# Patient Record
Sex: FEMALE
[De-identification: names, ages, dates, MRNs, and addresses within clinical notes are randomized per-mention and may not be internally consistent; named-entity substitution may affect disease eponyms.]

---

## 2017-03-31 ENCOUNTER — HOSPITAL ENCOUNTER (EMERGENCY)
Dept: HOSPITAL 42 - ED | Age: 37
Discharge: HOME | End: 2017-03-31
Payer: MEDICAID

## 2017-03-31 VITALS — TEMPERATURE: 98.6 F | HEART RATE: 67 BPM

## 2017-03-31 VITALS — RESPIRATION RATE: 12 BRPM | OXYGEN SATURATION: 99 %

## 2017-03-31 VITALS — BODY MASS INDEX: 23.8 KG/M2

## 2017-03-31 VITALS — DIASTOLIC BLOOD PRESSURE: 84 MMHG | SYSTOLIC BLOOD PRESSURE: 117 MMHG

## 2017-03-31 DIAGNOSIS — F17.210: ICD-10-CM

## 2017-03-31 DIAGNOSIS — R07.89: Primary | ICD-10-CM

## 2017-03-31 LAB
ADD MANUAL DIFF?: NO
ALBUMIN/GLOB SERPL: 1.4 {RATIO} (ref 1.1–1.8)
ALP SERPL-CCNC: 88 U/L (ref 38–133)
ALT SERPL-CCNC: 15 U/L (ref 7–56)
AST SERPL-CCNC: 18 U/L (ref 15–39)
BASOPHILS # BLD AUTO: 0.05 K/MM3 (ref 0–2)
BASOPHILS NFR BLD: 0.4 % (ref 0–3)
BILIRUB SERPL-MCNC: 0.5 MG/DL (ref 0.2–1.3)
BUN SERPL-MCNC: 14 MG/DL (ref 7–21)
CALCIUM SERPL-MCNC: 8.9 MG/DL (ref 8.4–10.5)
CHLORIDE SERPL-SCNC: 101 MMOL/L (ref 95–110)
CO2 SERPL-SCNC: 27 MMOL/L (ref 21–33)
EOSINOPHIL # BLD: 0.2 10*3/UL (ref 0–0.7)
EOSINOPHIL NFR BLD: 1.6 % (ref 1.5–5)
ERYTHROCYTE [DISTWIDTH] IN BLOOD BY AUTOMATED COUNT: 13.5 % (ref 11.5–14.5)
GLOBULIN SER-MCNC: 3.1 GM/DL
GLUCOSE SERPL-MCNC: 88 MG/DL (ref 70–110)
GRANULOCYTES # BLD: 8.29 10*3/UL (ref 1.4–6.5)
GRANULOCYTES NFR BLD: 66.9 % (ref 50–68)
HCT VFR BLD CALC: 41.4 % (ref 36–48)
LIPASE SERPL-CCNC: 125 U/L (ref 23–300)
LYMPHOCYTES # BLD: 3.4 10*3/UL (ref 1.2–3.4)
LYMPHOCYTES NFR BLD AUTO: 27.2 % (ref 22–35)
MCH RBC QN AUTO: 31.7 PG (ref 25–35)
MCHC RBC AUTO-ENTMCNC: 35.5 G/DL (ref 31–37)
MCV RBC AUTO: 89.4 FL (ref 80–105)
MONOCYTES # BLD AUTO: 0.5 10*3/UL (ref 0.1–0.6)
MONOCYTES NFR BLD: 3.9 % (ref 1–6)
PLATELET # BLD: 249 10^3/UL (ref 120–450)
PMV BLD AUTO: 10.6 FL (ref 7–11)
POTASSIUM SERPL-SCNC: 3 MMOL/L (ref 3.6–5)
PROT SERPL-MCNC: 7.3 G/DL (ref 5.8–8.3)
SODIUM SERPL-SCNC: 141 MMOL/L (ref 132–148)
WBC # BLD AUTO: 12.4 10^3/UL (ref 4.5–11)

## 2017-03-31 PROCEDURE — 80053 COMPREHEN METABOLIC PANEL: CPT

## 2017-03-31 PROCEDURE — 96360 HYDRATION IV INFUSION INIT: CPT

## 2017-03-31 PROCEDURE — 99284 EMERGENCY DEPT VISIT MOD MDM: CPT

## 2017-03-31 PROCEDURE — 83690 ASSAY OF LIPASE: CPT

## 2017-03-31 PROCEDURE — 96361 HYDRATE IV INFUSION ADD-ON: CPT

## 2017-03-31 PROCEDURE — 85025 COMPLETE CBC W/AUTO DIFF WBC: CPT

## 2017-03-31 NOTE — ED PDOC
Arrival/HPI





- General


Historian: Patient





- History of Present Illness


Time/Duration: > week


Symptom Course: Unchanged





<Sherif Stanley - Last Filed: 17 10:23>





<Immanuel Blanco DO - Last Filed: 17 10:47>





- General


Chief Complaint: Chest Pain


Time Seen by Provider: 17 08:47





- History of Present Illness


Narrative History of Present Illness (Text): 





17 09:22


35 y/o female with hx osteoporosis presenting with complaints of inferior chest 

wall pain. Pain has been present for over 2 weeks. She presents to the ED today 

because she states the pain worsened overnight. She denies any shortness of 

breath, diaphoresis, n/v/d or abdominal pain. Her chest pain is worse with 

movement and with certain positions. She does not exhibit calf pain, tenderness 

of swelling. She is currently on Depo-Provera shot for birth control and is an 

active smoker. She denies recent travel or prolonged immobility. Patient has no 

personal or family hx of coronary disease.  (Sherif Stanley)








Past Medical History





- Provider Review


Nursing Documentation Reviewed: Yes





- Infectious Disease


Hx of Infectious Diseases: None





- Tetanus Immunization


Tetanus Immunization: Up to Date





- Past Medical History


Past Medical History: No Previous





- Cardiac


Hx Cardiac Disorders: No





- Pulmonary


Hx Respiratory Disorders: No





- Neurological


Hx Neurological Disorder: No





- HEENT


Hx HEENT Disorder: No





- Renal


Hx Renal Disorder: No





- Endocrine/Metabolic


Hx Endocrine Disorders: No





- Hematological/Oncological


Hx Blood Disorders: No





- Integumentary


Hx Dermatological Disorder: No





- Musculoskeletal/Rheumatological


Hx Musculoskeletal Disorders: Yes


Hx Osteoporosis: Yes





- Gastrointestinal


Hx Gastrointestinal Disorders: No





- Genitourinary/Gynecological


Hx Genitourinary Disorders: No





- Psychiatric


Hx Psychophysiologic Disorder: Yes


Hx Anxiety: Yes


Hx Depression: Yes


Hx Physical Abuse: No


Hx Substance Use: No





- Past Surgical History


Past Surgical History: No Previous





- Surgical History


Hx  Section: Yes


Hx Musculoskeletal Surgery: Yes (left foot reconstruction)





- Anesthesia


Hx Anesthesia: Yes


Hx Anesthesia Reactions: No


Hx Malignant Hyperthermia: No





- Suicidal Assessment


Feels Threatened In Home Enviroment: No





<Sherif Stanley - Last Filed: 17 10:23>





Family/Social History





- Physician Review


Nursing Documentation Reviewed: Yes


Family/Social History: No Known Family HX


Smoking Status: Light Smoker < 10 Cigarettes Daily


Hx Alcohol Use: No


Hx Substance Use: No


Hx Substance Use Treatment: No





<LizethSherif - Last Filed: 17 10:23>





Allergies/Home Meds





<Liezth,Sherif - Last Filed: 17 10:23>





<Jeanneriakeyshawn Immanuel CISNEROS - Last Filed: 17 10:47>


Allergies/Adverse Reactions: 


Allergies





No Known Allergies Allergy (Verified 16 13:13)


 








Home Medications: 


 Home Meds











 Medication  Instructions  Recorded  Confirmed


 


clonazePAM [clonAZEPAM] 0.2 mg PO TID 04/01/15 03/31/17


 


Morphine [MS Contin] 60 mg PO TID 16


 


oxyCODONE [oxyCODONE Immediate 30 mg PO Q6 16





Release Tab]   














Review of Systems





- Physician Review


All systems were reviewed & negative as marked: Yes





- Review of Systems


Constitutional: Normal


Eyes: Normal


ENT: Normal


Respiratory: absent: SOB, Cough, Wheezing


Cardiovascular: Chest Pain, Palpitations.  absent: Syncope


Gastrointestinal: absent: Abdominal Pain, Diarrhea, Nausea, Vomiting


Genitourinary Female: absent: Dysuria, Frequency, Hematuria


Musculoskeletal: absent: Arthralgias, Back Pain


Skin: absent: Rash, Pruritis, Skin Lesions


Neurological: absent: Headache, Dizziness, Focal Weakness





<Lizeth,Sherif - Last Filed: 17 10:23>





Physical Exam


Vital Signs Reviewed: Yes


Temperature: Afebrile


Blood Pressure: Normal


Pulse: Regular


Respiratory Rate: Normal


Appearance: Positive for: Well-Appearing


Pain Distress: None


Mental Status: Positive for: Alert and Oriented X 3





- Systems Exam


Head: Present: Atraumatic, Normocephalic


Pupils: Present: PERRL


Extroacular Muscles: Present: EOMI


Conjunctiva: Present: Normal


Mouth: Present: Moist Mucous Membranes


Neck: Present: Normal Range of Motion.  No: JVD


Respiratory/Chest: Present: Good Air Exchange, Rhonchi, Tender to Palpation.  No

: Respiratory Distress


Cardiovascular: Present: Regular Rate and Rhythm, Normal S1, S2


Abdomen: Present: Normal Bowel Sounds.  No: Tenderness, Distention


Back: Present: Normal Inspection.  No: CVA Tenderness


Upper Extremity: Present: Normal Inspection, Normal ROM.  No: Cyanosis, Edema


Neurological: Present: GCS=15, CN II-XII Intact, Speech Normal


Skin: Present: Warm, Dry.  No: Rashes, Normal Color


Psychiatric: Present: Alert, Oriented x 3, Normal Insight, Normal Concentration





<Sherif Stanley - Last Filed: 17 10:23>


Vital Signs











  Temp Pulse Resp BP Pulse Ox


 


 17 09:41   90  12  117/84  99


 


 17 08:43  98.7 F  95 H  18  117/84  100

















Medical Decision Making





<Sherif Stanley - Last Filed: 17 10:23>





- Lab Interpretations


I have reviewed the lab results: Yes





<Immanuel Blanco DO - Last Filed: 17 10:47>


ED Course and Treatment: 


17 09:15


35 y/o female with hx osteoporosis presenting with non-cardiac chest pain. 

Symptoms are musculoskeletal in nature. She has point tenderness on palpation 

of anterior chest wall. Her pain is also exacerbated with pectoral muscle 

contraction. 


- Lipase r/o pancreatitis 


- urine pregnancy 


- will reassess














17 10:23


Chest pain is currently resolved. Labs reviewed. Hypokalemia noted. This is 

likely due to poor dietary intake. Patient will be discharged home to f/u with 

her PCP, Dr. Michel. Will prescribe Flexeril 10mg PRN as needed for chest wall 

discomfort.  (Sherif Stanley)


Patient seen and examined with resident.


Came up with treatment and disposition plan with resident.





The patient is a 36 year old female who comes in for evaluation of inferior 

chest wall pain. Additional HPI details as noted by the resident. Physical 

examination reveal point tenderness to palpation of the anterior chest wall. 

Based on History and physical examination the patient symptoms appear 

musculoskeletal in natures. Will obtain lab work and urine pregnancy to rule 

out pancreatitis and pregnancy. Will give patient IV Fluids. 





On re-evaluation, the patient feels better and is in no acute distress. Results 

and plan was discussed with the patient,  who expressed understanding. Patient 

in agreement with plan to be discharged home. Patient is stable for discharge. 

Patient was instructed to follow up with physician/clinic in 1-2 days or return 

if symptoms worsen or new concerning symptoms arise.





 (Immanuel Blanco DO)








- Lab Interpretations


Lab Results: 








 17 09:45 





 17 09:45 





 Lab Results





17 09:45: WBC 12.4 H, RBC 4.63, Hgb 14.7, Hct 41.4, MCV 89.4, MCH 31.7, 

MCHC 35.5, RDW 13.5, Plt Count 249, MPV 10.6, Gran % 66.9, Lymph % (Auto) 27.2, 

Mono % (Auto) 3.9, Eos % (Auto) 1.6, Baso % (Auto) 0.4, Gran # 8.29 H, Lymph # 

3.4, Mono # 0.5, Eos # 0.2, Baso # 0.05, Sodium 141, Potassium 3.0 L, Chloride 

101, Carbon Dioxide 27, Anion Gap 16, BUN 14, Creatinine 0.8, Est GFR (African 

Amer) > 60, Est GFR (Non-Af Amer) > 60, Random Glucose 88, Calcium 8.9, Total 

Bilirubin 0.5, AST 18, ALT 15, Alkaline Phosphatase 88, Total Protein 7.3, 

Albumin 4.3, Globulin 3.1, Albumin/Globulin Ratio 1.4, Lipase 125














- Medication Orders


Current Medication Orders: 








Sodium Chloride (Sodium Chloride 0.9%)  1,000 mls @ 250 mls/hr IV .Q4H ONE


   Stop: 17 13:07


   Last Admin: 17 09:50  Dose: 250 MLS/HR





eMAR Start Stop


 Document     17 09:50  MMA  (Rec: 17 09:51  MMA  [facility] [id number]


     Intravenous Solution


      Start Date                                 17


      Start Time                                 09:50


      End Date                                   17


      End time                                   13:50


      Total Infusion Time                        240

















<Sherif Stanley - Last Filed: 17 10:23>





- Scribe Statement


The provider has reviewed the documentation as recorded by the Scribe





<Immanuel Blanco DO - Last Filed: 17 10:47>





- Scribe Statement


Suzanna Dorantes





Provider Scribe Attestation:


All medical record entries made by the Scribe were at my direction and 

personally dictated by me. I have reviewed the chart and agree that the record 

accurately reflects my personal performance of the history, physical exam, 

medical decision making, and the department course for this patient. I have 

also personally directed, reviewed, and agree with the discharge instructions 

and disposition.





 (Immanuel Blanco DO)








Disposition/Present on Arrival





- Present on Arrival


Any Indicators Present on Arrival: No


History of DVT/PE: No


History of Uncontrolled Diabetes: No


Urinary Catheter: No


History of Decub. Ulcer: No


History Surgical Site Infection Following: None





- Disposition


Have Diagnosis and Disposition been Completed?: Yes


Disposition Time: 10:24


Isolation: Droplet





<Sherif Stanley - Last Filed: 17 10:23>





<Immanuel Blanco DO - Last Filed: 17 10:47>





- Disposition


Diagnosis: 


 Non-cardiac chest pain


Disposition: HOME/ ROUTINE


Patient Problems: 


 Current Active Problems











Problem Status Diagnosed


 


Non-cardiac chest pain Acute 











Condition: GOOD


Discharge Instructions (ExitCare):  Chest Pain (ED)


Additional Instructions: 


Please see your family physician within one week for evaluation. 


Return to the ER if symptoms worsen or change. 


You are prescribed Flexeril. Take this medication as prescribed for symptoms of 

chest wall pain. 


Prescriptions: 


Cyclobenzaprine [Cyclobenzaprine HCl] 10 mg PO Q8H PRN #15 tab


 PRN Reason: muscle cramping and pain 


Referrals: 


Ameena Michel MD [Primary Care Provider] - Follow up with primary

## 2017-12-11 ENCOUNTER — HOSPITAL ENCOUNTER (INPATIENT)
Dept: HOSPITAL 42 - ED | Age: 37
LOS: 1 days | Discharge: LEFT BEFORE DISCHARGE | DRG: 253 | End: 2017-12-12
Attending: INTERNAL MEDICINE | Admitting: INTERNAL MEDICINE
Payer: MEDICAID

## 2017-12-11 VITALS — RESPIRATION RATE: 18 BRPM | OXYGEN SATURATION: 100 %

## 2017-12-11 VITALS — BODY MASS INDEX: 23.8 KG/M2

## 2017-12-11 DIAGNOSIS — F17.210: ICD-10-CM

## 2017-12-11 DIAGNOSIS — F41.9: ICD-10-CM

## 2017-12-11 DIAGNOSIS — M81.0: ICD-10-CM

## 2017-12-11 DIAGNOSIS — R79.1: ICD-10-CM

## 2017-12-11 DIAGNOSIS — R40.2412: ICD-10-CM

## 2017-12-11 DIAGNOSIS — F32.89: ICD-10-CM
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
## 2017-12-11 DIAGNOSIS — S82.851A: Primary | ICD-10-CM

## 2017-12-11 DIAGNOSIS — E87.6: ICD-10-CM

## 2017-12-11 DIAGNOSIS — D68.9: ICD-10-CM

## 2017-12-11 NOTE — ED PDOC
Arrival/HPI





- General


Chief Complaint: Lower Extremity Problem/Injury


Time Seen by Provider: 17 21:50


Historian: Patient





- History of Present Illness


Narrative History of Present Illness (Text): 


17 23:29


37 year old female who presents to the Emergency department status post 

mechanical fall just prior to arrival. Patient admits to drinking a pint of ENG 

(?alcoholic beverage) and reports she fell down stairs. Patient reports she 

injured her right ankle and foot. Patient denies head trauma, loss of 

consciousness, headache, dizziness, neck pain, back pain, fever, chills, chest 

pain, shortness of breath, nausea, vomiting, or any other complaints.





Time/Duration: Prior to Arrival


Symptom Onset: Sudden


Symptom Course: Unchanged


Context: Walking, Slipped (fell down stairs)





Past Medical History





- Provider Review


Nursing Documentation Reviewed: Yes





- Infectious Disease


Hx of Infectious Diseases: None





- Tetanus Immunization


Tetanus Immunization: Up to Date





- Reproductive


Currently Pregnant: No





- Past Medical History


Past Medical History: No Previous





- Cardiac


Hx Cardiac Disorders: No





- Pulmonary


Hx Respiratory Disorders: No





- Neurological


Hx Neurological Disorder: No





- HEENT


Hx HEENT Disorder: No





- Renal


Hx Renal Disorder: No





- Endocrine/Metabolic


Hx Endocrine Disorders: No





- Hematological/Oncological


Hx Blood Disorders: No





- Integumentary


Hx Dermatological Disorder: No





- Musculoskeletal/Rheumatological


Hx Musculoskeletal Disorders: Yes


Hx Osteoporosis: Yes





- Gastrointestinal


Hx Gastrointestinal Disorders: No





- Genitourinary/Gynecological


Hx Genitourinary Disorders: No





- Psychiatric


Hx Psychophysiologic Disorder: Yes


Hx Anxiety: Yes


Hx Depression: Yes


Hx Physical Abuse: No


Hx Substance Use: No





- Past Surgical History


Past Surgical History: No Previous





- Surgical History


Hx  Section: Yes


Hx Musculoskeletal Surgery: Yes (left foot reconstruction)





- Anesthesia


Hx Anesthesia: Yes


Hx Anesthesia Reactions: No


Hx Malignant Hyperthermia: No





- Suicidal Assessment


Feels Threatened In Home Enviroment: No





Family/Social History





- Physician Review


Nursing Documentation Reviewed: Yes


Family/Social History: Unknown Family HX


Smoking Status: Light Smoker < 10 Cigarettes Daily


Hx Alcohol Use: No


Hx Substance Use: No


Hx Substance Use Treatment: No





Allergies/Home Meds


Allergies/Adverse Reactions: 


Allergies





No Known Allergies Allergy (Verified 16 13:13)


 








Home Medications: 


 Home Meds











 Medication  Instructions  Recorded  Confirmed


 


clonazePAM [clonAZEPAM] 0.2 mg PO TID 04/01/15 12/12/17


 


Morphine [MS Contin] 60 mg PO TID 16


 


oxyCODONE [oxyCODONE Immediate 30 mg PO Q6 16





Release Tab]   














Review of Systems





- Patients Enrolled in  Initiative


[X]: A conversation was conducted with the primary medical doctor.





- Physician Review


All systems were reviewed & negative as marked: Yes





- Review of Systems


Constitutional: Normal.  absent: Fevers


Eyes: Normal


ENT: Normal


Respiratory: Normal.  absent: SOB, Cough


Cardiovascular: Normal.  absent: Chest Pain, Syncope


Gastrointestinal: Normal.  absent: Abdominal Pain, Nausea, Vomiting


Genitourinary Female: Normal.  absent: Dysuria


Musculoskeletal: Arthralgias (+right ankle/foot pain).  absent: Back Pain, Neck 

Pain


Skin: Normal


Neurological: Normal.  absent: Headache, Dizziness


Endocrine: Normal


Hemo/Lymphatic: Normal


Psychiatric: Normal





Physical Exam


Vital Signs Reviewed: Yes


Vital Signs











  Temp Pulse Resp BP Pulse Ox


 


 17 23:22  98.6 F  90  18  138/82  100


 


 17 21:30  97.5 F L  89  18  125/98 H  100











Temperature: Afebrile


Blood Pressure: Normal


Pulse: Regular


Respiratory Rate: Normal


Appearance: Positive for: Well-Appearing, Other (Smell of alcohol)


Pain Distress: None


Mental Status: Positive for: other (pt is sleeping, arousable, responsive to 

painful stimuli. )





- Systems Exam


Head: Present: Atraumatic, Normocephalic


Pupils: Present: PERRL


Extroacular Muscles: Present: EOMI


Conjunctiva: Present: Normal


Mouth: Present: Moist Mucous Membranes


Neck: Present: Normal Range of Motion.  No: Meningeal Signs, MIDLINE TENDERNESS

, Paraspinal Tenderness


Respiratory/Chest: Present: Clear to Auscultation, Good Air Exchange.  No: 

Respiratory Distress, Accessory Muscle Use


Cardiovascular: Present: Regular Rate and Rhythm, Normal S1, S2.  No: Murmurs


Abdomen: Present: Normal Bowel Sounds.  No: Tenderness, Distention, Peritoneal 

Signs


Back: Present: Normal Inspection.  No: CVA Tenderness, Midline Tenderness, 

Paraspinal Tenderness, Pain with Leg Raise


Upper Extremity: Present: Normal Inspection.  No: Cyanosis, Edema


Lower Extremity: Present: Edema (mild edema to right ankle), NORMAL PULSES, 

Tenderness (moderate tenderness to right ankle, right foot is not tender to 

palpation), Deformity (slight deformity of right ankle), Neurovascularly Intact

, Capillary Refill < 2 s.  No: CALF TENDERNESS, Cyanosis, Normal ROM (limited 

ROM to right ankle secondary to pain), Swelling, Other (no bruising to right 

foot)


Neurological: Present: GCS=15, CN II-XII Intact, Speech Normal, Motor Func 

Grossly Intact, Normal Sensory Function


Skin: Present: Warm, Dry, Normal Color.  No: Rashes


Psychiatric: Present: Alert, Oriented x 3, Normal Insight, Normal Concentration





Medical Decision Making


ED Course and Treatment: 


17 23:29


Impression:


37 year old female presents to the ED status post fall and injury to right foot 

and ankle.





Upon initial evaluation, patient patient appears to be under the influence of 

either alcohol or drugs, will give the patient PO tramadol for now for pain 

control. XR R ankle and foot ordered. 





Differential Diagnosis included but are not limited to: fracture vs sprain vs 

contusion





Plan:


-- Labs, Alcohol serum, Beta-HCG, blood type and screen 


-- Urinalysis, urine drug Screen


-- Morphine


-- Zofran


-- Tramadol


-- IV fluids, banana bag


-- Right Ankle x-ray


-- Right Foot x-ray


--Reassess and disposition





Prior Visits:


Notes and results from previous visits were reviewed. On 3/31/17 patient was 

evaluated in the Emergency department for chest wall pain. Patient was 

discharged home.





Progress Notes:


XR R ankle : +trimalleolar fracture, no dislocation, as read by PA and ER MD. 


XR R foot : no fracture, no dislocation, as read by PA and ER MD. 





Call placed to ortho on call Dr. Christianson, agree with inpt admission and 

for OR in the AM, request that the patient be admitted under the hospitalist. 

Labs and IV ordered. Patient and her daughter were notified of XR results and 

further plan of action for inpatient admission. 





Orthoglass stirrup and posterior splint applied by PA. Neurovascular intact 

post splint application. Given morphine 4 mg IV and zofran 4 mg IV. 





Case d/w Dr. Morris, agrees with plan for inpt admission. 





Etoh level is (-). UDS is pending. 








After evaluation by Dr. Morris, the patient became loud, is belligerent and 

uncooperative. She is threatening to leave the hospital, however the patient is 

still  not of sound mind and does not comprehend the extent of her injury and 

the necessary treatment needed. Patient medicated with ativan 1 mg IV. Because 

the patient is now becoming physically violent to staff and attempting the hit 

EMTs and RNS, 3 point restraints were applied. Patient was escorted by RN 

supervisor and security upstairs to the medical floor. 





Patient's fiance arrived, states that the patient takes klonopin daily and at 

times becomes verbally abusive to him and her family at times. 








- Lab Interpretations


Lab Results: 








 17 23:50 





 17 23:50 





 Lab Results





17 23:50: Magnesium 2.1


17 23:50: Beta HCG, Quant < 2.39, Alcohol, Quantitative < 10


17 23:50: Sodium 140, Potassium 3.4 L, Chloride 104, Carbon Dioxide 25, 

Anion Gap 15, BUN 15, Creatinine 0.9, Est GFR (African Amer) > 60, Est GFR (Non-

Af Amer) > 60, Random Glucose 105, Calcium 9.9, Total Bilirubin 1.1, AST 21, 

ALT 20, Alkaline Phosphatase 98, Total Protein 8.3, Albumin 5.0 H, Globulin 3.3

, Albumin/Globulin Ratio 1.5


17 23:50: PT 16.9 H, INR 1.54 H, APTT 32.1


17 23:50: WBC 22.2 H D, RBC 5.39, Hgb 17.7 H, Hct 49.5 H, MCV 91.8, MCH 

32.8, MCHC 35.8, RDW 12.9, Plt Count 234, MPV 11.1 H, Gran % 80.3 H, Lymph % (

Auto) 14.7 L, Mono % (Auto) 4.2, Eos % (Auto) 0.5 L, Baso % (Auto) 0.3, Gran # 

17.81 H, Lymph # 3.3, Mono # 0.9 H, Eos # 0.1, Baso # 0.06








I have reviewed the lab results: Yes





- RAD Interpretation


Radiology Orders: 








17 21:50


ANKLE RIGHT 3 VIEWS ROUTINE [RAD] Stat 


FOOT RIGHT 3 VIEWS ROUTINE [RAD] Stat 











: ED Physician





- Medication Orders


Current Medication Orders: 








Famotidine (Pepcid)  20 mg IVP DAILY RON


Multivitamins/Vitamin C 10 ml/Thiamine HCl 100 mg/ Folic Acid 1 mg/ Sodium 

Chloride  1,011.2 mls @ 500 mls/hr IV .Q2H2M ONE


   Stop: 17 02:04


Sodium Chloride (Sodium Chloride 0.9%)  1,000 mls @ 100 mls/hr IV .Q10H RON


Morphine Sulfate (Morphine)  1 mg IVP Q4H PRN


   PRN Reason: Pain, moderate (4-7)





Discontinued Medications





Sodium Chloride (Sodium Chloride 0.9%)  1,000 mls @ 1,000 mls/hr IV .Q1H STA


   Stop: 17 00:19


   Last Admin: 17 23:52  Dose: 1,000 mls/hr





eMAR Start Stop


 Document     17 23:52  AB  (Rec: 17 23:52  [id number]


     Intravenous Solution


      Start Date                                 17


      Start Time                                 23:52


      End Date                                   17





Lorazepam (Ativan)  1 mg IVP ONCE ONE


   PRN Reason: Protocol


   Stop: 17 00:57


   Last Admin: 17 01:00  Dose: 1 mg





IVP Administration


 Document     17 01:00  AB  (Rec: 17 01:00  [id number]


     Charges for Administration


      # of IVP Administrations                   1





Lorazepam (Ativan)  2 mg IVP ONCE ONE


   PRN Reason: Protocol


   Stop: 17 01:19


Morphine Sulfate (Morphine)  4 mg IVP STAT STA


   Stop: 17 23:47


   Last Admin: 17 23:51  Dose: 4 mg





MAR Pain Assessment


 Document     17 23:51  AB  (Rec: 17 23:52  [id number]


     Pain Reassessment


      Is this a pain reassessment?               Yes


     Sleep


      Is patient sleeping during reassessment?   No


     Presence of Pain


      Presence of Pain                           Yes


     Pain Scale Used


      Pain Scale Used                            Numeric


     Location


      Left, Right or Bilateral                   Right


      Upper or Lower                             Lower


      Pain Location Body Site                    Leg


     Description


      Description                                Constant


IVP Administration


 Document     17 23:51  AB  (Rec: 17 23:52  [id number]


     Charges for Administration


      # of IVP Administrations                   1





Ondansetron HCl (Zofran Inj)  4 mg IVP STAT STA


   Stop: 17 23:47


   Last Admin: 17 23:55  Dose: 4 mg





IVP Administration


 Document     17 23:55  AB  (Rec: 17 23:55  [id number]


     Charges for Administration


      # of IVP Administrations                   1





Potassium Chloride (Potassium Chloride Oral Soln)  40 meq PO ONCE ONE


   Stop: 17 00:43


Tramadol HCl (Ultram)  50 mg PO STAT STA


   Stop: 17 21:51











- PA / NP / Resident Statement


MD/DO has reviewed & agrees with the documentation as recorded.





- Scribe Statement


The provider has reviewed the documentation as recorded by the Scriblisa Hess





All medical record entries made by the Scriblisa were at my direction and 

personally dictated by me. I have reviewed the chart and agree that the record 

accurately reflects my personal performance of the history, physical exam, 

medical decision making, and the department course for this patient. I have 

also personally directed, reviewed, and agree with the discharge instructions 

and disposition.








Disposition/Present on Arrival





- Present on Arrival


Any Indicators Present on Arrival: No


History of DVT/PE: No


History of Uncontrolled Diabetes: No


Urinary Catheter: No


History of Decub. Ulcer: No


History Surgical Site Infection Following: None





- Disposition


Have Diagnosis and Disposition been Completed?: Yes


Diagnosis: 


 Trimalleolar fracture of right ankle





Disposition: HOSPITALIZED


Disposition Time: 00:15


Patient Plan: Admission


Patient Problems: 


 Current Active Problems











Problem Status Onset


 


Trimalleolar fracture of right ankle Acute  











Condition: STABLE

## 2017-12-12 ENCOUNTER — HOSPITAL ENCOUNTER (INPATIENT)
Dept: HOSPITAL 42 - ED | Age: 37
LOS: 2 days | Discharge: HOME | DRG: 253 | End: 2017-12-14
Attending: INTERNAL MEDICINE | Admitting: INTERNAL MEDICINE
Payer: MEDICAID

## 2017-12-12 VITALS — SYSTOLIC BLOOD PRESSURE: 138 MMHG | HEART RATE: 90 BPM | TEMPERATURE: 98.6 F | DIASTOLIC BLOOD PRESSURE: 82 MMHG

## 2017-12-12 VITALS — BODY MASS INDEX: 26.4 KG/M2

## 2017-12-12 DIAGNOSIS — F43.10: ICD-10-CM

## 2017-12-12 DIAGNOSIS — S82.871A: ICD-10-CM
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
## 2017-12-12 DIAGNOSIS — R79.1: ICD-10-CM

## 2017-12-12 DIAGNOSIS — R45.851: ICD-10-CM
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
## 2017-12-12 DIAGNOSIS — E78.00: ICD-10-CM

## 2017-12-12 DIAGNOSIS — F13.90: ICD-10-CM
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
## 2017-12-12 DIAGNOSIS — F17.200: ICD-10-CM

## 2017-12-12 DIAGNOSIS — M81.0: ICD-10-CM

## 2017-12-12 DIAGNOSIS — Z79.891: ICD-10-CM

## 2017-12-12 DIAGNOSIS — I10: ICD-10-CM

## 2017-12-12 DIAGNOSIS — Z87.01: ICD-10-CM

## 2017-12-12 DIAGNOSIS — F19.20: ICD-10-CM

## 2017-12-12 DIAGNOSIS — Z91.19: ICD-10-CM

## 2017-12-12 DIAGNOSIS — Z82.5: ICD-10-CM

## 2017-12-12 DIAGNOSIS — S82.851A: Primary | ICD-10-CM

## 2017-12-12 DIAGNOSIS — F41.8: ICD-10-CM

## 2017-12-12 DIAGNOSIS — Z79.899: ICD-10-CM

## 2017-12-12 DIAGNOSIS — D72.828: ICD-10-CM

## 2017-12-12 DIAGNOSIS — E87.6: ICD-10-CM

## 2017-12-12 DIAGNOSIS — F91.9: ICD-10-CM

## 2017-12-12 LAB
ALBUMIN/GLOB SERPL: 1.5 {RATIO} (ref 1.1–1.8)
ALP SERPL-CCNC: 98 U/L (ref 38–126)
ALT SERPL-CCNC: 20 U/L (ref 7–56)
APPEARANCE UR: (no result)
APTT BLD: 32.1 SECONDS (ref 25.1–36.5)
AST SERPL-CCNC: 21 U/L (ref 14–36)
BACTERIA #/AREA URNS HPF: (no result) /[HPF]
BASOPHILS # BLD AUTO: 0.05 K/MM3 (ref 0–2)
BASOPHILS # BLD AUTO: 0.06 K/MM3 (ref 0–2)
BASOPHILS NFR BLD: 0.3 % (ref 0–3)
BASOPHILS NFR BLD: 0.3 % (ref 0–3)
BILIRUB SERPL-MCNC: 1.1 MG/DL (ref 0.2–1.3)
BILIRUB UR-MCNC: NEGATIVE MG/DL
BUN SERPL-MCNC: 15 MG/DL (ref 7–21)
CALCIUM SERPL-MCNC: 9.9 MG/DL (ref 8.4–10.5)
CHLORIDE SERPL-SCNC: 104 MMOL/L (ref 98–107)
CO2 SERPL-SCNC: 25 MMOL/L (ref 21–33)
COLOR UR: YELLOW
EOSINOPHIL # BLD: 0.1 10*3/UL (ref 0–0.7)
EOSINOPHIL # BLD: 0.1 10*3/UL (ref 0–0.7)
EOSINOPHIL NFR BLD: 0.5 % (ref 1.5–5)
EOSINOPHIL NFR BLD: 0.5 % (ref 1.5–5)
ERYTHROCYTE [DISTWIDTH] IN BLOOD BY AUTOMATED COUNT: 12.8 % (ref 11.5–14.5)
ERYTHROCYTE [DISTWIDTH] IN BLOOD BY AUTOMATED COUNT: 12.9 % (ref 11.5–14.5)
ETHANOL SERPL-MCNC: < 10 MG/DL (ref 0–10)
GLOBULIN SER-MCNC: 3.3 GM/DL
GLUCOSE SERPL-MCNC: 105 MG/DL (ref 70–110)
GLUCOSE UR STRIP-MCNC: NEGATIVE MG/DL
GRANULOCYTES # BLD: 15.16 10*3/UL (ref 1.4–6.5)
GRANULOCYTES # BLD: 17.81 10*3/UL (ref 1.4–6.5)
GRANULOCYTES NFR BLD: 80.1 % (ref 50–68)
GRANULOCYTES NFR BLD: 80.3 % (ref 50–68)
HCT VFR BLD CALC: 47.7 % (ref 36–48)
HCT VFR BLD CALC: 49.5 % (ref 36–48)
INR PPP: 1.54 (ref 0.93–1.08)
KETONES UR STRIP-MCNC: 15 MG/DL
LEUKOCYTE ESTERASE UR-ACNC: NEGATIVE LEU/UL
LYMPHOCYTES # BLD: 2.5 10*3/UL (ref 1.2–3.4)
LYMPHOCYTES # BLD: 3.3 10*3/UL (ref 1.2–3.4)
LYMPHOCYTES NFR BLD AUTO: 13.1 % (ref 22–35)
LYMPHOCYTES NFR BLD AUTO: 14.7 % (ref 22–35)
MCH RBC QN AUTO: 32 PG (ref 25–35)
MCH RBC QN AUTO: 32.8 PG (ref 25–35)
MCHC RBC AUTO-ENTMCNC: 34.4 G/DL (ref 31–37)
MCHC RBC AUTO-ENTMCNC: 35.8 G/DL (ref 31–37)
MCV RBC AUTO: 91.8 FL (ref 80–105)
MCV RBC AUTO: 93 FL (ref 80–105)
MONOCYTES # BLD AUTO: 0.9 10*3/UL (ref 0.1–0.6)
MONOCYTES # BLD AUTO: 1.1 10*3/UL (ref 0.1–0.6)
MONOCYTES NFR BLD: 4.2 % (ref 1–6)
MONOCYTES NFR BLD: 6 % (ref 1–6)
PH UR STRIP: 6.5 [PH] (ref 4.7–8)
PLATELET # BLD: 234 10^3/UL (ref 120–450)
PLATELET # BLD: 238 10^3/UL (ref 120–450)
PMV BLD AUTO: 11.1 FL (ref 7–11)
PMV BLD AUTO: 11.6 FL (ref 7–11)
POTASSIUM SERPL-SCNC: 3.4 MMOL/L (ref 3.6–5)
PROT SERPL-MCNC: 8.3 G/DL (ref 5.8–8.3)
PROT UR STRIP-MCNC: (no result) MG/DL
RBC # UR STRIP: (no result) /UL
SODIUM SERPL-SCNC: 140 MMOL/L (ref 132–148)
SP GR UR STRIP: 1.02 (ref 1–1.03)
UROBILINOGEN UR STRIP-ACNC: 0.2 E.U./DL
WBC # BLD AUTO: 18.9 10^3/UL (ref 4.5–11)
WBC # BLD AUTO: 22.2 10^3/UL (ref 4.5–11)

## 2017-12-12 RX ADMIN — CEFTRIAXONE SCH MLS/HR: 1 INJECTION, SOLUTION INTRAVENOUS at 14:54

## 2017-12-12 RX ADMIN — MORPHINE SULFATE SCH: 2 INJECTION, SOLUTION INTRAMUSCULAR; INTRAVENOUS at 16:43

## 2017-12-12 RX ADMIN — MORPHINE SULFATE SCH MG: 2 INJECTION, SOLUTION INTRAMUSCULAR; INTRAVENOUS at 14:49

## 2017-12-12 RX ADMIN — MORPHINE SULFATE SCH MG: 2 INJECTION, SOLUTION INTRAMUSCULAR; INTRAVENOUS at 19:41

## 2017-12-12 RX ADMIN — VANCOMYCIN HYDROCHLORIDE SCH MLS/HR: 1 INJECTION, POWDER, LYOPHILIZED, FOR SOLUTION INTRAVENOUS at 16:06

## 2017-12-12 NOTE — RAD
PROCEDURE:  Right Foot Radiographs.



HISTORY:

pain  



COMPARISON:

None.



FINDINGS:



BONES:

Normal. No fracture. 



JOINTS:

Normal. 



SOFT TISSUES:

Normal. 



OTHER FINDINGS:

None.



IMPRESSION:

Normal right foot radiographs.

## 2017-12-12 NOTE — CP.PCM.DIS
Provider





- Provider


Date of Admission: 


12/11/17 23:55





Attending physician: 


Jonny Khan MD





Primary care physician: 


Ameena Michel MD





Time Spent in preparation of Discharge (in minutes): 45





Diagnosis





- Discharge Diagnosis


(1) Trimalleolar fracture of right ankle


Status: Acute   





Hospital Course





- Lab Results


Lab Results: 


 Most Recent Lab Values











WBC  22.2 10^3/ul (4.5-11.0)  H D 12/11/17  23:50    


 


RBC  5.39 10^6/uL (3.5-6.1)   12/11/17  23:50    


 


Hgb  17.7 g/dL (12.0-16.0)  H  12/11/17  23:50    


 


Hct  49.5 % (36.0-48.0)  H  12/11/17  23:50    


 


MCV  91.8 fl (80.0-105.0)   12/11/17  23:50    


 


MCH  32.8 pg (25.0-35.0)   12/11/17  23:50    


 


MCHC  35.8 g/dl (31.0-37.0)   12/11/17  23:50    


 


RDW  12.9 % (11.5-14.5)   12/11/17  23:50    


 


Plt Count  234 10^3/uL (120.0-450.0)   12/11/17  23:50    


 


MPV  11.1 fl (7.0-11.0)  H  12/11/17  23:50    


 


Gran %  80.3 % (50.0-68.0)  H  12/11/17  23:50    


 


Lymph % (Auto)  14.7 % (22.0-35.0)  L  12/11/17  23:50    


 


Mono % (Auto)  4.2 % (1.0-6.0)   12/11/17  23:50    


 


Eos % (Auto)  0.5 % (1.5-5.0)  L  12/11/17  23:50    


 


Baso % (Auto)  0.3 % (0.0-3.0)   12/11/17  23:50    


 


Gran #  17.81  (1.4-6.5)  H  12/11/17  23:50    


 


Lymph #  3.3  (1.2-3.4)   12/11/17  23:50    


 


Mono #  0.9  (0.1-0.6)  H  12/11/17  23:50    


 


Eos #  0.1  (0.0-0.7)   12/11/17  23:50    


 


Baso #  0.06 K/mm3 (0.0-2.0)   12/11/17  23:50    


 


PT  16.9 SECONDS (9.4-12.5)  H  12/11/17  23:50    


 


INR  1.54  (0.93-1.08)  H  12/11/17  23:50    


 


APTT  32.1 Seconds (25.1-36.5)   12/11/17  23:50    


 


Sodium  140 mmol/L (132-148)   12/11/17  23:50    


 


Potassium  3.4 mmol/L (3.6-5.0)  L  12/11/17  23:50    


 


Chloride  104 mmol/L ()   12/11/17  23:50    


 


Carbon Dioxide  25 mmol/L (21-33)   12/11/17  23:50    


 


Anion Gap  15  (10-20)   12/11/17  23:50    


 


BUN  15 mg/dL (7-21)   12/11/17  23:50    


 


Creatinine  0.9 mg/dl (0.7-1.2)   12/11/17  23:50    


 


Est GFR ( Amer)  > 60   12/11/17  23:50    


 


Est GFR (Non-Af Amer)  > 60   12/11/17  23:50    


 


Random Glucose  105 mg/dL ()   12/11/17  23:50    


 


Calcium  9.9 mg/dL (8.4-10.5)   12/11/17  23:50    


 


Magnesium  2.1 mg/dL (1.7-2.2)   12/11/17  23:50    


 


Total Bilirubin  1.1 mg/dL (0.2-1.3)   12/11/17  23:50    


 


AST  21 U/L (14-36)   12/11/17  23:50    


 


ALT  20 U/L (7-56)   12/11/17  23:50    


 


Alkaline Phosphatase  98 U/L ()   12/11/17  23:50    


 


Total Protein  8.3 g/dL (5.8-8.3)   12/11/17  23:50    


 


Albumin  5.0 g/dL (3.0-4.8)  H  12/11/17  23:50    


 


Globulin  3.3 gm/dL  12/11/17  23:50    


 


Albumin/Globulin Ratio  1.5  (1.1-1.8)   12/11/17  23:50    


 


Beta HCG, Quant  < 2.39 mIU/mL (0-6.15)   12/11/17  23:50    


 


Alcohol, Quantitative  < 10 mg/dL (0-10)   12/11/17  23:50    


 


Blood Type  O POSITIVE   12/12/17  00:15    


 


Antibody Screen  Negative   12/12/17  00:15    


 


BBK History Checked  Patient has bt   12/12/17  00:15    














- Hospital Course


Hospital Course: 








Patient is a 37 year old female with past medical history of osteoporosis who 

was admitted for evaluation and treatment status post fall. States that she 

fell down a flight of stairs after missing a step. Endorsed that the missed 

step was due to the environment being dark. Denied drinking or doing illicit 

drugs. Admitted to experiencing sudden severe right sided lower extremity pain 

that is localized to the right ankle. The pain was characterized as sharp, 10/10

, and nonradiating. Patient denied hitting head or loss of consciousness. 

During transfer from ED to the floors daniel lopez was called as patient was 

attempting to leave. Upon being transferred to the floors the patient wanted to 

leave against medical advice. Patient is AAO x 3 and denied homicidal and 

suicidal ideation. It was  explained to the patient that staying in the 

hospital will allow for continued medical treatment and medical evaluation. 

Patient was informed that leaving against medical advice will increase her risk 

of morbidity, mortality, loss of limbs, death, stroke, and paralysis. Patient 

both understands and appreciates the significance of leaving against medical 

advice. Nursing supervisor, Priscilla,  was contacted as patient cannot physically 

ambulate without overt difficulty. We were informed by her that the patient can 

be assisted out of the hospital to her ride. AMA form is filled and placed in 

chart. Patient was escorted off of hospital grounds to her ride.








Discharge Exam





- Additional Findings


Additional findings: 





please refer to HPI from today





Discharge Plan





- Follow Up Plan


Condition: STABLE


Disposition: AGAINST MEDICAL ADVICE


Referrals: 


Ameena Michel MD [Primary Care Provider] -

## 2017-12-12 NOTE — CP.PCM.HP
Addendum entered and electronically signed by Rodney Mclean DO   15:06: 





A/P:


s/p Fall s/p ankle fracture


- Dr. Christianson: awaiting recommendations of how to care for the patient.  

will continue to keep her stable medically, pending Dr. Christianson's 

recommendations for placement and/or physical therapy





Original Note:








<Rodney Mclean - Last Filed: 17 14:35>





History of Present Illness





- History of Present Illness


History of Present Illness: 


H/P for Dr. Phelps - MITCH CISNEROS, PGY-1, Pager [phone]





CC: 





s/p Fall s/p Fracture





HPI:





37 F with questionable history of osteoporosis and chronic pain presents after 

falling.  Pt was at [facility] earlier today and signed out AMA with the same 

complaint.  On that visit, patient stated that she fell down a series of stairs 

because she missed a step.  On this admission, the patient states that she was 

running after her son, tripped, and fell.  She currently has pain in her R 

ankle.


Pt denies trauma to the head, loss of consciousness, any bowel/bladder loss, or 

biting her tongue.  Pt does state that she sees a neurologist out patient 

because she has been 'unsteady on her feet' as of late.








Pt denies fevers/chills/chest pain/shortness of breath/nausea/vomiting/diarrhea/

dysuria/frequency/urgency/hematuria/hematochezia/hematemesis





PMHx:    osteoporosis


PSHx:    denies


Family Hx:    mother- "complications from tobacco abuse" 


Social Hx:     denies ETOH use, admits to 1/2 ppd of tobacco use for 20 years, 

denies illicit drug use


Medications:    Duloxetine 60 qD, Gabapentin 100 mg bid, Ambien 10 hs, 

Oxycodone 30 mg q4, Multivitamin qD, Vit D qD, Flexeril 10 mg hs, Zocor 10 mg qD


PMD:    Ameena Michel 


Neurologist:   Dr. García in [place]


Pharmacy:    [facility]





ROS:


Const'l:    denies fever, chills, generalized weakness


ENT:    denies dysphagia, otalgia, hearing deficit, rhinorrhea


Eyes:    denies sudden loss of vision, diplopia, blurred vision


MSK:    denies muscle stiffness, joint pain, extremity cramping


Cardio:    denies shorness of breath, heart murmur, chest pain


Pulm:    denies cough, hemoptysis, wheeze


GI:    denies loss of appetite, abdominal pain, constipation, melena, nausea, 

vomiting, diarrhea


:    denies burning on urination, urinary frequency, hematuria, urinary 

urgency


Neuro:    denies paresis, paresthesia, dizziness, headache, numbness, tingling


Derm:    denies skin changes, lesions, nail changes


Endo:    denies intolerance to heat/cold, diaphoresis, night sweats, polydipsia


Psych:    denies anxiety, depression, mood changes





Present on Admission





- Present on Admission


Any Indicators Present on Admission: No





Past Patient History





- Infectious Disease


Hx of Infectious Diseases: None





- Tetanus Immunizations


Tetanus Immunization: Up to Date





- Past Social History


Smoking Status: Heavy Smoker > 10 Cigarettes Daily





- CARDIAC


Hx Hypertension: Yes





- PULMONARY


Hx Respiratory Disorders: No





- NEUROLOGICAL


Hx Neurological Disorder: No





- HEENT


Hx HEENT Problems: No





- RENAL


Hx Chronic Kidney Disease: No





- ENDOCRINE/METABOLIC


Hx Endocrine Disorders: No





- HEMATOLOGICAL/ONCOLOGICAL


Hx Blood Disorders: No





- INTEGUMENTARY


Hx Dermatological Problems: No





- MUSCULOSKELETAL/RHEUMATOLOGICAL


Hx Musculoskeletal Disorders: Yes


Hx Osteoporosis: Yes





- GASTROINTESTINAL


Hx Gastrointestinal Disorders: No





- GENITOURINARY/GYNECOLOGICAL


Hx Genitourinary Disorders: No





- PSYCHIATRIC


Hx Psychophysiologic Disorder: Yes


Hx Anxiety: Yes


Hx Depression: Yes


Hx Physical Abuse: No


Hx Substance Use: No





- SURGICAL HISTORY


Hx  Section: Yes


Hx Musculoskeletal Surgery: Yes (left foot reconstruction)





- ANESTHESIA


Hx Anesthesia: Yes


Hx Anesthesia Reactions: No


Hx Malignant Hyperthermia: No





Meds


Allergies/Adverse Reactions: 


 Allergies











Allergy/AdvReac Type Severity Reaction Status Date / Time


 


No Known Allergies Allergy   Verified 16 13:13














Physical Exam





- Additional Findings


Additional findings: 


Phys Exam: 


VS as below


Const'l:    tearful, seems intoxicated; a&o x 4, no acute distress


Head/Neck:    neck supple, no jvd, trachea midline, carotid midline, no cervical

/head mass


Eyes:    kandi, nonicteric sclera, eom intact


ENT:    auditory acuity grossly intact, throat not congested, no nasal deformity


Cardio:    regular rate rhythm, no murmurs/rubs/gallops, no carotid bruit, 

normal s1, s2


Pulm:    no accessory muscle use, equal normal breath sounds bilaterally, clear 

to auscultation bilaterally


Abd:    soft/non-tender/non-distended, normal bowel sounds x 4 quadrants, no 

palpable masses


Derm:    no rashes, no ulcers, no lesions


Extr:    +Right foot in a cast; neurovascularly in tact, no loss of sensation, 

no loss of functionality, warm; no edema, no cyanosis, no calf tenderness, no 

lesions, no varicosities


Neuro:    cn II-XII grossly intact, upper extremity and lower extremity 5/5 

muscle strength bilaterally, no loss of sensation upper extremity, lower 

extremity bilaterally and core





Results





- Vital Signs


Recent Vital Signs: 





 Last Vital Signs











Temp  98.3 F   17 08:33


 


Pulse  89   17 11:12


 


Resp  18   17 11:12


 


BP  141/100 H  17 11:12


 


Pulse Ox  98   17 11:12














- Labs


Result Diagrams: 


 17 09:22








Assessment & Plan





- Assessment and Plan (Free Text)


Assessment: 


A/P





Patient is a 37 year old female with past medical history of osteoporosis who 

presents to the emergency department via EMS for evaluation and treatment 

status post fall. 





Fall


- Regular diet


- IVF NS @ 100


- Ortho c/s: Dr. Christianson


- pain control morphine 1 mg q6 prn pain 


- UA and UDS pending





Leukocytosis, 2/2 to Infection VS Reactive


- ID C/s: Dr. Mejia


- 1 dose of vanc and rocephin





Electrolyte Abnormality


- hypokalemia- repleted and monitor closely via CMP


- mg normal





Elevated INR; Coagulopathy VS EtOH consumption


- patient does not take anticoagulation at home 


- consider FFP for reversal pending ortho recs





Tobacco Abuse


- nicotine patch given


- smoking cessation advised


- patient education provided on dangers of tobacco abuse





Prophylaxis


- DVT ppx-no SCD as patients right LE is wrapped and splinted, no heparin due 

to potential surgery tomorrow in AM


- GI ppx- protonix





<Hugh Phelps - Last Filed: 17 17:06>





Results





- Vital Signs


Recent Vital Signs: 





 Last Vital Signs











Temp  98.3 F   17 08:33


 


Pulse  89   17 11:12


 


Resp  18   17 11:12


 


BP  141/100 H  17 11:12


 


Pulse Ox  98   12/12/17 11:12














- Labs


Result Diagrams: 


 17 09:22








Attending/Attestation





- Attestation


I have personally seen and examined this patient.: Yes


I have fully participated in the care of the patient.: Yes


I have reviewed all pertinent clinical information: Yes


Notes (Text): 





17 17:00





attending note;


Patient seen and examined with resident in ER.





patient is a 37-year-old female with a past medical history of chronic opiate 

dependency, history of left leg injury is admitted with right ankle fracture.


The patient was admitted yesterday  for the same problem and she signed against 

medical advice.


Came back secondary to significant right ankle pain.


Case discussed with orthopedics by ER attending.


Awaiting official evaluation by orthopedics for further plan.





Leukocytosis; no source of infection. Possibly reactive. Trending down.


Chest x-rays negative. UA and blood culture ordered.


Got 1 dose of vancomycin and Rocephin.


ID evaluation requested.





Patient is currently getting IV morphine.





chronic opiate dependency; patient has been on opiates for more than 10 years. 

patient with a history of left leg injury in the past.





History of anxiety and depression; continue Ativan when necessary. patient has 

episodes of agitation.


Psychiatric evaluation requested.





Active smoking; smoking cessation is strongly advised. Started on Nicoerm patch.





patient is medically stable for orthopedic procedure.





upon discharge the patient will follow up with PMD Dr. Ameena Michel.

## 2017-12-12 NOTE — CP.PCM.CON
History of Present Illness





- History of Present Illness


History of Present Illness: 


Infectious Disease Consultation:


December 12, 2017





38 yo female with history of falls with pain in the right ankle.  Patient with 

leukocytosis.  She is not cooperative with exam or interview.  She curses alot 

during interactions with her.  Scheduled for OR tomorrow.  She is seeing a 

neurologist as an outpatient for an unsteady gait.  Leukocytosis of 18.9.  This 

can be secondary to the fracture in her right ankle.





PMHx:


osteoporosis is all she will admit to





PSHx:


none





Allergies:


NKDA





Social Hx:


Tobacco use 1/2 ppd for 20 years


Denies EtOH or illicit drug use





Active Medications





Acetaminophen (Tylenol 325mg Tab)  650 mg PO Q4 PRN


   PRN Reason: Fever >100.4 F


Sodium Chloride (Sodium Chloride 0.9%)  1,000 mls @ 100 mls/hr IV .Q10H [facility]


   Last Admin: 12/12/17 09:45 Dose:  100 mls/hr


Ceftriaxone Sodium (Rocephin 1 Gram Ivpb (D5w))  1 gm in 100 mls @ 100 mls/hr 

IVPB DAILY [facility]


   PRN Reason: Protocol


   Last Admin: 12/12/17 14:54 Dose:  100 mls/hr


Vancomycin HCl (Vancomycin 1gm)  1 gm in 250 mls @ 167 mls/hr IVPB DAILY [facility]


   PRN Reason: Protocol


   Last Admin: 12/12/17 16:06 Dose:  167 mls/hr


Lorazepam (Ativan)  1 mg IVP Q4 PRN; Protocol


   PRN Reason: Anxiety


   Last Admin: 12/12/17 18:16 Dose:  1 mg


Morphine Sulfate (Morphine)  2 mg IVP Q4 [facility]ON


   Last Admin: 12/12/17 19:41 Dose:  2 mg


Nicotine (Nicoderm Cq)  1 patch TD DAILY [facility]ON


   Last Admin: 12/12/17 16:37 Dose:  1 patch


Ondansetron HCl (Zofran Inj)  4 mg IVP Q4H PRN


   PRN Reason: Nausea/Vomiting


Pantoprazole Sodium (Protonix Ec Tab)  40 mg PO 0600 [facility]





Family Hx:


nothing specific given





ROS:


Denies fevers, chills, nausea, vomiting, diarrhea, headaches, dizziness, chest 

pain, abdominal pain, melena, hematuria, hematemesis, hematochezia, depression, 

anxiety.





Past Patient History





- Infectious Disease


Hx of Infectious Diseases: None





- Tetanus Immunizations


Tetanus Immunization: Up to Date





- Past Social History


Smoking Status: Current Some Days Smoker





- CARDIAC


Hx Hypercholesterolemia: Yes





- PULMONARY


Hx Pneumonia: Yes





- NEUROLOGICAL


Hx Neurological Disorder: No





- HEENT


Hx HEENT Problems: No





- RENAL


Hx Chronic Kidney Disease: No





- ENDOCRINE/METABOLIC


Hx Endocrine Disorders: No





- HEMATOLOGICAL/ONCOLOGICAL


Hx Blood Disorders: No





- INTEGUMENTARY


Hx Dermatological Problems: No





- MUSCULOSKELETAL/RHEUMATOLOGICAL


Hx Falls: Yes





- GASTROINTESTINAL


Hx Gastrointestinal Disorders: No





- GENITOURINARY/GYNECOLOGICAL


Hx Genitourinary Disorders: No





- PSYCHIATRIC


Hx Anxiety: Yes


Hx Substance Use: No





- SURGICAL HISTORY


Hx Musculoskeletal Surgery: Yes





- ANESTHESIA


Hx Anesthesia: Yes


Hx Anesthesia Reactions: No


Hx Malignant Hyperthermia: No





Meds


Allergies/Adverse Reactions: 


 Allergies











Allergy/AdvReac Type Severity Reaction Status Date / Time


 


No Known Allergies Allergy   Verified 05/18/16 13:13














- Medications


Medications: 


 Current Medications





Acetaminophen (Tylenol 325mg Tab)  650 mg PO Q4 PRN


   PRN Reason: Fever >100.4 F


Sodium Chloride (Sodium Chloride 0.9%)  1,000 mls @ 100 mls/hr IV .Q10H [facility]


   Last Admin: 12/12/17 09:45 Dose:  100 mls/hr


Ceftriaxone Sodium (Rocephin 1 Gram Ivpb (D5w))  1 gm in 100 mls @ 100 mls/hr 

IVPB DAILY [facility]


   PRN Reason: Protocol


   Last Admin: 12/12/17 14:54 Dose:  100 mls/hr


Vancomycin HCl (Vancomycin 1gm)  1 gm in 250 mls @ 167 mls/hr IVPB DAILY [facility]


   PRN Reason: Protocol


   Last Admin: 12/12/17 16:06 Dose:  167 mls/hr


Lorazepam (Ativan)  1 mg IVP Q4 PRN; Protocol


   PRN Reason: Anxiety


   Last Admin: 12/12/17 18:16 Dose:  1 mg


Morphine Sulfate (Morphine)  2 mg IVP Q4 [facility]


   Last Admin: 12/12/17 19:41 Dose:  2 mg


Nicotine (Nicoderm Cq)  1 patch TD DAILY [facility]


   Last Admin: 12/12/17 16:37 Dose:  1 patch


Ondansetron HCl (Zofran Inj)  4 mg IVP Q4H PRN


   PRN Reason: Nausea/Vomiting


Pantoprazole Sodium (Protonix Ec Tab)  40 mg PO 0600 [facility]











Results





- Vital Signs


Recent Vital Signs: 


 Last Vital Signs











Temp  99.5 F   12/12/17 17:48


 


Pulse  76   12/12/17 17:48


 


Resp  20   12/12/17 17:48


 


BP  153/92 H  12/12/17 17:48


 


Pulse Ox  98   12/12/17 11:12














- Labs


Result Diagrams: 


 12/12/17 09:22





Labs: 


 Laboratory Results - last 24 hr











  12/12/17





  19:18


 


Urine Color  Yellow


 


Urine Appearance  Slight-cloudy


 


Urine pH  6.5


 


Ur Specific Gravity  1.025


 


Urine Protein  Trace H


 


Urine Glucose (UA)  Negative


 


Urine Ketones  15 H


 


Urine Blood  Moderate H


 


Urine Nitrate  Negative


 


Urine Bilirubin  Negative


 


Urine Urobilinogen  0.2


 


Ur Leukocyte Esterase  Negative


 


Urine RBC  10 - 15


 


Urine WBC  1 - 3


 


Ur Epithelial Cells  1 - 3


 


Urine Bacteria  Few

## 2017-12-12 NOTE — RAD
HISTORY:

pre op  



COMPARISON:

04/01/2015. 



FINDINGS:



LUNGS:

The lungs are well inflated and clear.



PLEURA:

No significant pleural effusion identified, no pneumothorax apparent.



CARDIOVASCULAR:

Normal.



OSSEOUS STRUCTURES:

No significant abnormalities.



VISUALIZED UPPER ABDOMEN:

Normal.



OTHER FINDINGS:

None.



IMPRESSION:

No active pulmonary disease.

## 2017-12-12 NOTE — CP.PCM.HP
History of Present Illness





- History of Present Illness


History of Present Illness: 








CC: fall





Subjective:


HPI:


Patient is a 37 year old female with past medical history of osteoporosis who 

presents to the emergency department for evaluation and treatment status post 

fall. States that she fell down a flight of stairs after missing a step. 

Endorses that the missed step was due to the environment being dark. Denies 

drinking or doing illicit drugs. Admits to experiencing sudden severe right 

sided lower extremity pain that is localized to the right ankle. The pain is 

characterized as sharp, 10/10, and nonradiating. Patient denies hitting head or 

loss of consciousness.  Patient denies intractable headache, fever, chills, 

dizziness, blurry vision, ringing in the ears, chest pain, shortness of breath, 

abdominal pain, nausea, vomiting, diarrhea, constipation, and urinary symptoms. 





ROS: 12 point review of systems negative except as indicated in HPI





PMHx: osteoporosis


PSHx: denies


Family Hx: mother- "complications from tobacco abuse" 


Social Hx:  denies ETOH use, admits to 1/2 ppd of tobacco use for 20 years, 

denies illicit drug use


Medications: Please see medication reconciliation


PMD: Ameena Michel 


Pharmacy: unknown at this time








Physical Examination:


- Constitutional


Appears: Non-toxic, No Acute Distress





- Head Exam


Head Exam: atraumatic, normocephalic





- Eye Exam


Eye Exam:  Normal appearance, PERRL.  absent: Scleral icterus





- ENT Exam


ENT Exam: Mucous Membranes Moist





- Neck Exam


Neck exam:  Normal Inspection





- Respiratory Exam


Respiratory Exam: Normal Breathing Pattern





- Cardiovascular Exam


Cardiovascular Exam:  +S1, +S2.  absent: Gallop, JVD





- GI/Abdominal Exam


GI & Abdominal Exam: Normal Bowel Sounds, absent: Distended, Guarding, 

Pulsatile Mass, Rebound, Rigid





- Extremities Exam


Extremities exam: right ankle is splinted; cannot further assess right ankle 

due to severe pain Negative for: calf tenderness





- Neurological Exam


Neurological exam: Patient can be awaken, alert, responds to verbal stimuli, 

answers questions appropriately, follows commands, and moves bilateral upper 

extremities past midline





- Psychiatric Exam


Psychiatric exam: Normal Affect, Normal Mood





- Skin


Skin Exam: warm and dry





Assessment and Plan:


Patient is a 37 year old female with past medical history of osteoporosis who 

presents to the emergency department via EMS for evaluation and treatment 

status post fall. 





Fall


- foot xray- awaiting official report at time of admission- will follow up and 

endorse to day team


- ankle xray- awaiting official report at time of admission- suspecting right 

ankle trimaleolar fracture- will follow up and endorse to day team


- NPO


- IVF NS @ 100


- ortho contacted by ED- as per NP Dr. Garcia will take patient to OR in 

AM


- pain control morphine 1 mg q6 prn pain 


- ETOH pending at time of admission- will follow up and endorse to day team


- UDS pending at time of admission- will follow up and endorse to day team





Electrolyte Abnormality


- hypokalemia- repleted and monitor closely via CMP


- mag ordered and pending





Tobacco Abuse


- nicotine patch offered


- smoking cessation advised


- patient education provided on dangers of tobacco abuse





Prophylaxis


- DVT ppx-no SCD as patients right LE is wrapped and splinted, no heparin due 

to potential surgery tomorrow in AM


- GI ppx- famotidine





Patient case discussed with and plan approved by attending physician. 





17 23:59











Past Patient History





- Infectious Disease


Hx of Infectious Diseases: None





- Tetanus Immunizations


Tetanus Immunization: Up to Date





- Past Social History


Smoking Status: Light Smoker < 10 Cigarettes Daily





- CARDIAC


Hx Cardiac Disorders: No





- PULMONARY


Hx Respiratory Disorders: No





- NEUROLOGICAL


Hx Neurological Disorder: No





- HEENT


Hx HEENT Problems: No





- RENAL


Hx Chronic Kidney Disease: No





- ENDOCRINE/METABOLIC


Hx Endocrine Disorders: No





- HEMATOLOGICAL/ONCOLOGICAL


Hx Blood Disorders: No





- INTEGUMENTARY


Hx Dermatological Problems: No





- MUSCULOSKELETAL/RHEUMATOLOGICAL


Hx Musculoskeletal Disorders: Yes


Hx Osteoporosis: Yes





- GASTROINTESTINAL


Hx Gastrointestinal Disorders: No





- GENITOURINARY/GYNECOLOGICAL


Hx Genitourinary Disorders: No





- PSYCHIATRIC


Hx Psychophysiologic Disorder: Yes


Hx Anxiety: Yes


Hx Depression: Yes


Hx Physical Abuse: No


Hx Substance Use: No





- SURGICAL HISTORY


Hx  Section: Yes


Hx Musculoskeletal Surgery: Yes (left foot reconstruction)





- ANESTHESIA


Hx Anesthesia: Yes


Hx Anesthesia Reactions: No


Hx Malignant Hyperthermia: No





Meds


Allergies/Adverse Reactions: 


 Allergies











Allergy/AdvReac Type Severity Reaction Status Date / Time


 


No Known Allergies Allergy   Verified 16 13:13














Results





- Vital Signs


Recent Vital Signs: 





 Last Vital Signs











Temp  97.5 F L  17 21:30


 


Pulse  89   17 21:30


 


Resp  18   17 21:30


 


BP  125/98 H  17 21:30


 


Pulse Ox  100   17 21:30














- Labs


Result Diagrams: 


 17 23:50





 17 23:50

## 2017-12-12 NOTE — CP.PCM.HP
Addendum entered and electronically signed by Uvaldo Dorantes DO  17 02:20: 





During transfer from ED to the floors daniel lopez was called as patient was 

attempting to leave. Upon being transferred to the floors the patient wants to 

leave against medical advice. Patient is AAO x 3 and denies homicidal and 

suicidal ideation. It was  explained to the patient that staying in the 

hospital will allow for continued medical treatment and medical evaluation. 

Patient was informed that leaving against medical advice will increase her risk 

of morbidity, mortality, loss of limbs, death, stroke, and paralysis. Patient 

both understands and appreciates the significance of leaving against medical 

advice. Nursing supervisor, Priscilla,  was contacted as patient cannot physically 

ambulate without overt difficulty. We were informed by her that the patient can 

be assisted out of the hospital to her ride. AMA form is filled and placed in 

chart. Patient will be escorted off of hospital grounds.





Patient seen with, case reviewed with, and plan approved by attending 

physician. 





Original Note:








<Uvaldo Dorantes - Last Filed: 17 02:12>





History of Present Illness





- History of Present Illness


History of Present Illness: 





CC: fall





Subjective:


HPI:


Patient is a 37 year old female with past medical history of osteoporosis who 

presents to the emergency department for evaluation and treatment status post 

fall. States that she fell down a flight of stairs after missing a step. 

Endorses that the missed step was due to the environment being dark. Denies 

drinking or doing illicit drugs. Admits to experiencing sudden severe right 

sided lower extremity pain that is localized to the right ankle. The pain is 

characterized as sharp, 10/10, and nonradiating. Patient denies hitting head or 

loss of consciousness.  Patient denies intractable headache, fever, chills, 

dizziness, blurry vision, ringing in the ears, chest pain, shortness of breath, 

abdominal pain, nausea, vomiting, diarrhea, constipation, and urinary symptoms. 





ROS: 12 point review of systems negative except as indicated in HPI





PMHx: osteoporosis


PSHx: denies


Family Hx: mother- "complications from tobacco abuse" 


Social Hx:  denies ETOH use, admits to 1/2 ppd of tobacco use for 20 years, 

denies illicit drug use


Medications: Please see medication reconciliation


PMD: Ameena Michel 


Pharmacy: unknown at this time- will ask again in AM





Physical Examination:


- Constitutional


Appears: Non-toxic, No Acute Distress





- Head Exam


Head Exam: atraumatic, normocephalic





- Eye Exam


Eye Exam:  Normal appearance, PERRL.  absent: Scleral icterus





- ENT Exam


ENT Exam: Mucous Membranes Moist





- Neck Exam


Neck exam:  Normal Inspection





- Respiratory Exam


Respiratory Exam: Normal Breathing Pattern





- Cardiovascular Exam


Cardiovascular Exam:  +S1, +S2.  absent: Gallop, JVD





- GI/Abdominal Exam


GI & Abdominal Exam: Normal Bowel Sounds, absent: Distended, Guarding, 

Pulsatile Mass, Rebound, Rigid





- Extremities Exam


Extremities exam: right ankle is splinted; cannot further assess right ankle 

due to pain Negative for: calf tenderness





- Neurological Exam


Neurological exam: Patient can be awakened, she is alert, responds to verbal 

stimuli, answers questions appropriately, follows commands, and moves bilateral 

upper extremities past midline





- Psychiatric Exam


Psychiatric exam: Normal Affect, Normal Mood





- Skin


Skin Exam: warm and dry





Assessment and Plan:


Patient is a 37 year old female with past medical history of osteoporosis who 

presents to the emergency department via EMS for evaluation and treatment 

status post fall. 





Fall


- foot xray- awaiting official report at time of admission- will follow up and 

endorse to day team


- ankle xray- awaiting official report at time of admission- suspecting right 

ankle trimaleolar fracture- will follow up and endorse to day team


- NPO


- IVF NS @ 100


- ortho contacted by ED- as per NP Dr. Garcia will take patient to OR in 

AM


- pain control morphine 1 mg q6 prn pain 


- ETOH less than 10


- UDS pending at time of admission- will follow up and endorse to day team


- WBC elevation - can be reactive vs. infection, consider infectious disease 

consult pending her clinical course





Electrolyte Abnormality


- hypokalemia- repleted and monitor closely via CMP


- mag ordered and pending





Elevated INR; Coagulopathy


- patient does not take anticoagulation at home 


- consider FFP for reversal pending ortho recs





Tobacco Abuse


- nicotine patch offered


- smoking cessation advised


- patient education provided on dangers of tobacco abuse





Prophylaxis


- DVT ppx-no SCD as patients right LE is wrapped and splinted, no heparin due 

to potential surgery tomorrow in AM


- GI ppx- famotidine





Patient case discussed with and plan approved by attending physician. 








Present on Admission





- Present on Admission


Any Indicators Present on Admission: No





Past Patient History





- Infectious Disease


Hx of Infectious Diseases: None





- Tetanus Immunizations


Tetanus Immunization: Up to Date





- Past Social History


Smoking Status: Light Smoker < 10 Cigarettes Daily





- CARDIAC


Hx Cardiac Disorders: No





- PULMONARY


Hx Respiratory Disorders: No





- NEUROLOGICAL


Hx Neurological Disorder: No





- HEENT


Hx HEENT Problems: No





- RENAL


Hx Chronic Kidney Disease: No





- ENDOCRINE/METABOLIC


Hx Endocrine Disorders: No





- HEMATOLOGICAL/ONCOLOGICAL


Hx Blood Disorders: No





- INTEGUMENTARY


Hx Dermatological Problems: No





- MUSCULOSKELETAL/RHEUMATOLOGICAL


Hx Musculoskeletal Disorders: Yes


Hx Osteoporosis: Yes





- GASTROINTESTINAL


Hx Gastrointestinal Disorders: No





- GENITOURINARY/GYNECOLOGICAL


Hx Genitourinary Disorders: No





- PSYCHIATRIC


Hx Psychophysiologic Disorder: Yes


Hx Anxiety: Yes


Hx Depression: Yes


Hx Physical Abuse: No


Hx Substance Use: No





- SURGICAL HISTORY


Hx  Section: Yes


Hx Musculoskeletal Surgery: Yes (left foot reconstruction)





- ANESTHESIA


Hx Anesthesia: Yes


Hx Anesthesia Reactions: No


Hx Malignant Hyperthermia: No





Meds


Allergies/Adverse Reactions: 


 Allergies











Allergy/AdvReac Type Severity Reaction Status Date / Time


 


No Known Allergies Allergy   Verified 16 13:13














Results





- Vital Signs


Recent Vital Signs: 





 Last Vital Signs











Temp  97.5 F L  17 21:30


 


Pulse  89   17 21:30


 


Resp  18   17 21:30


 


BP  125/98 H  17 21:30


 


Pulse Ox  100   17 21:30














- Labs


Result Diagrams: 


 17 23:50





 17 23:50


Labs: 





 Laboratory Results - last 24 hr











  17





  23:50 23:50 23:50


 


WBC  22.2 H D  


 


RBC  5.39  


 


Hgb  17.7 H  


 


Hct  49.5 H  


 


MCV  91.8  


 


MCH  32.8  


 


MCHC  35.8  


 


RDW  12.9  


 


Plt Count  234  


 


MPV  11.1 H  


 


Gran %  80.3 H  


 


Lymph % (Auto)  14.7 L  


 


Mono % (Auto)  4.2  


 


Eos % (Auto)  0.5 L  


 


Baso % (Auto)  0.3  


 


Gran #  17.81 H  


 


Lymph #  3.3  


 


Mono #  0.9 H  


 


Eos #  0.1  


 


Baso #  0.06  


 


PT   16.9 H 


 


INR   1.54 H 


 


APTT   32.1 


 


Sodium    140


 


Potassium    3.4 L


 


Chloride    104


 


Carbon Dioxide    25


 


Anion Gap    15


 


BUN    15


 


Creatinine    0.9


 


Est GFR ( Amer)    > 60


 


Est GFR (Non-Af Amer)    > 60


 


Random Glucose    105


 


Calcium    9.9


 


Total Bilirubin    1.1


 


AST    21


 


ALT    20


 


Alkaline Phosphatase    98


 


Total Protein    8.3


 


Albumin    5.0 H


 


Globulin    3.3


 


Albumin/Globulin Ratio    1.5


 


Beta HCG, Quant   


 


Alcohol, Quantitative   


 


BBK History Checked   














  17





  23:50 00:15


 


WBC  


 


RBC  


 


Hgb  


 


Hct  


 


MCV  


 


MCH  


 


MCHC  


 


RDW  


 


Plt Count  


 


MPV  


 


Gran %  


 


Lymph % (Auto)  


 


Mono % (Auto)  


 


Eos % (Auto)  


 


Baso % (Auto)  


 


Gran #  


 


Lymph #  


 


Mono #  


 


Eos #  


 


Baso #  


 


PT  


 


INR  


 


APTT  


 


Sodium  


 


Potassium  


 


Chloride  


 


Carbon Dioxide  


 


Anion Gap  


 


BUN  


 


Creatinine  


 


Est GFR ( Amer)  


 


Est GFR (Non-Af Amer)  


 


Random Glucose  


 


Calcium  


 


Total Bilirubin  


 


AST  


 


ALT  


 


Alkaline Phosphatase  


 


Total Protein  


 


Albumin  


 


Globulin  


 


Albumin/Globulin Ratio  


 


Beta HCG, Quant  < 2.39 


 


Alcohol, Quantitative  < 10 


 


BBK History Checked   Patient has bt














<Tracie Morris - Last Filed: 17 02:53>





Results





- Vital Signs


Recent Vital Signs: 





 Last Vital Signs











Temp  98.6 F   17 23:22


 


Pulse  90   17 23:22


 


Resp  18   17 23:22


 


BP  138/82   17 23:22


 


Pulse Ox  100   17 23:22














- Labs


Result Diagrams: 


 17 23:50





 17 23:50


Labs: 





 Laboratory Results - last 24 hr











  17





  00:15


 


BBK History Checked  Patient has bt














Attending/Attestation





- Attestation


I have personally seen and examined this patient.: Yes


I have fully participated in the care of the patient.: Yes


I have reviewed all pertinent clinical information: Yes


Notes (Text): 





17 02:36


Agree with documentation and orders placed.


Note:


Patient was alert,oriented x 3 when I evaluated her.She was upset because she 

stated she was not aware that she had to be admitted to the hospital.She signed 

out AMA even after she was advised of the possibility of grave or even fatal 

consequences of her decision. In particular she was told she could loose her 

leg or her foot if she does not get the care she needs.

## 2017-12-12 NOTE — ED PDOC
Arrival/HPI





- General


Chief Complaint: Lower Extremity Problem/Injury


Time Seen by Provider: 17 08:34


Historian: Patient, EMS





- History of Present Illness


Time/Duration: Prior to Arrival


Symptom Onset: Sudden


Associated Symptoms (Text): 





17 08:47


Patient was seen in the emergency department last night following a mechanical 

fall with a trimalleolar right ankle fracture. She was belligerent and 

uncooperative and required chemical and physical restraints in the emergency 

department. She was admitted to the hospital, and then signed out AMA after 

being admitted to the floor. She returns to the emergency department 

complaining of severe right ankle pain. She is already in a posterior and U-

splint. Capillary refill is good. She has multiple chronic narcotic pain 

medications. No new injury or trauma. She had her blood work and x-rays in 

preparation for the OR. These will not be repeated. Discussed the case with the 

hospitalist today who accepts admission.





Past Medical History





- Infectious Disease


Hx of Infectious Diseases: None





- Tetanus Immunization


Tetanus Immunization: Up to Date





- Reproductive


Currently Pregnant: No





- Past Medical History


Past Medical History: No Previous





- Cardiac


Hx Hypertension: Yes





- Pulmonary


Hx Respiratory Disorders: No





- Neurological


Hx Neurological Disorder: No





- HEENT


Hx HEENT Disorder: No





- Renal


Hx Renal Disorder: No





- Endocrine/Metabolic


Hx Endocrine Disorders: No





- Hematological/Oncological


Hx Blood Disorders: No





- Integumentary


Hx Dermatological Disorder: No





- Musculoskeletal/Rheumatological


Hx Musculoskeletal Disorders: Yes


Hx Osteoporosis: Yes





- Gastrointestinal


Hx Gastrointestinal Disorders: No





- Genitourinary/Gynecological


Hx Genitourinary Disorders: No





- Psychiatric


Hx Psychophysiologic Disorder: Yes


Hx Anxiety: Yes


Hx Depression: Yes


Hx Physical Abuse: No


Hx Substance Use: No





- Past Surgical History


Past Surgical History: No Previous





- Surgical History


Hx  Section: Yes


Hx Musculoskeletal Surgery: Yes (left foot reconstruction)





- Anesthesia


Hx Anesthesia: Yes


Hx Anesthesia Reactions: No


Hx Malignant Hyperthermia: No





- Suicidal Assessment


Feels Threatened In Home Enviroment: No





Family/Social History





- Physician Review


Nursing Documentation Reviewed: Yes


Family/Social History: Unknown Family HX


Smoking Status: Heavy Smoker > 10 Cigarettes Daily


Hx Alcohol Use: Yes


Frequency of alcohol use: Socially


Hx Substance Use: No


Hx Substance Use Treatment: No





Allergies/Home Meds


Allergies/Adverse Reactions: 


Allergies





No Known Allergies Allergy (Verified 16 13:13)


 








Home Medications: 


 Home Meds











 Medication  Instructions  Recorded  Confirmed


 


clonazePAM [clonAZEPAM] 0.2 mg PO TID 04/01/15 12/12/17


 


Morphine [MS Contin] 60 mg PO TID 16


 


oxyCODONE [oxyCODONE Immediate 30 mg PO Q6 16





Release Tab]   














Review of Systems





- Physician Review


All systems were reviewed & negative as marked: Yes





Physical Exam


Temperature: Afebrile


Blood Pressure: Normal


Pulse: Regular


Respiratory Rate: Normal


Appearance: Positive for: Well-Appearing, Non-Toxic, Comfortable


Pain Distress: None


Mental Status: Positive for: Alert and Oriented X 3





- Systems Exam


Respiratory/Chest: Present: Clear to Auscultation, Good Air Exchange.  No: 

Respiratory Distress, Accessory Muscle Use


Cardiovascular: Present: Regular Rate and Rhythm, Normal S1, S2.  No: Murmurs


Lower Extremity: Present: Other (Right lower extremity is already splinted. 

Good capillary refill. The splint was not taken down. X-rays were reviewed.)





Disposition/Present on Arrival





- Present on Arrival


Any Indicators Present on Arrival: No


History of DVT/PE: No


History of Uncontrolled Diabetes: No


Urinary Catheter: No


History of Decub. Ulcer: No


History Surgical Site Infection Following: None





- Disposition


Have Diagnosis and Disposition been Completed?: Yes


Diagnosis: 


 Trimalleolar fracture of right ankle





Disposition: HOSPITALIZED


Disposition Time: 08:53


Patient Plan: Admission


Condition: FAIR

## 2017-12-12 NOTE — RAD
PROCEDURE:  Right Ankle Radiographs.



HISTORY:

pain  



COMPARISON:

None



FINDINGS:



BONES:

There is a comminuted displaced fracture of the distal tibia and 

fibula. 



JOINTS:

The talar dome is intact. There is no dislocation



SOFT TISSUES:

Normal. 



OTHER FINDINGS:

None.



IMPRESSION:

Comminuted fractures of the distal tibia and fibula.  No dislocation

## 2017-12-13 LAB
ALBUMIN/GLOB SERPL: 1.5 {RATIO} (ref 1.1–1.8)
ALP SERPL-CCNC: 75 U/L (ref 38–126)
ALT SERPL-CCNC: 22 U/L (ref 7–56)
APTT BLD: 28 SECONDS (ref 25.1–36.5)
AST SERPL-CCNC: 17 U/L (ref 14–36)
BASOPHILS # BLD AUTO: 0.04 K/MM3 (ref 0–2)
BASOPHILS NFR BLD: 0.3 % (ref 0–3)
BILIRUB SERPL-MCNC: 1.1 MG/DL (ref 0.2–1.3)
BUN SERPL-MCNC: 14 MG/DL (ref 7–21)
CALCIUM SERPL-MCNC: 8.6 MG/DL (ref 8.4–10.5)
CHLORIDE SERPL-SCNC: 113 MMOL/L (ref 98–107)
CO2 SERPL-SCNC: 21 MMOL/L (ref 21–33)
EOSINOPHIL # BLD: 0.2 10*3/UL (ref 0–0.7)
EOSINOPHIL NFR BLD: 1.5 % (ref 1.5–5)
ERYTHROCYTE [DISTWIDTH] IN BLOOD BY AUTOMATED COUNT: 13 % (ref 11.5–14.5)
GLOBULIN SER-MCNC: 2.6 GM/DL
GLUCOSE SERPL-MCNC: 106 MG/DL (ref 70–110)
GRANULOCYTES # BLD: 8.73 10*3/UL (ref 1.4–6.5)
GRANULOCYTES NFR BLD: 70.4 % (ref 50–68)
HCT VFR BLD CALC: 42.2 % (ref 36–48)
INR PPP: 1.82 (ref 0.93–1.08)
LYMPHOCYTES # BLD: 2.7 10*3/UL (ref 1.2–3.4)
LYMPHOCYTES NFR BLD AUTO: 21.4 % (ref 22–35)
MCH RBC QN AUTO: 31.9 PG (ref 25–35)
MCHC RBC AUTO-ENTMCNC: 34.6 G/DL (ref 31–37)
MCV RBC AUTO: 92.3 FL (ref 80–105)
MONOCYTES # BLD AUTO: 0.8 10*3/UL (ref 0.1–0.6)
MONOCYTES NFR BLD: 6.4 % (ref 1–6)
PLATELET # BLD: 181 10^3/UL (ref 120–450)
PMV BLD AUTO: 11 FL (ref 7–11)
POTASSIUM SERPL-SCNC: 3 MMOL/L (ref 3.6–5)
PROT SERPL-MCNC: 6.5 G/DL (ref 5.8–8.3)
SODIUM SERPL-SCNC: 145 MMOL/L (ref 132–148)
WBC # BLD AUTO: 12.4 10^3/UL (ref 4.5–11)

## 2017-12-13 PROCEDURE — 2W3QXYZ IMMOBILIZATION OF RIGHT LOWER LEG USING OTHER DEVICE: ICD-10-PCS | Performed by: ORTHOPAEDIC SURGERY

## 2017-12-13 RX ADMIN — MORPHINE SULFATE PRN MG: 2 INJECTION, SOLUTION INTRAMUSCULAR; INTRAVENOUS at 20:58

## 2017-12-13 RX ADMIN — MORPHINE SULFATE SCH MG: 2 INJECTION, SOLUTION INTRAMUSCULAR; INTRAVENOUS at 13:07

## 2017-12-13 RX ADMIN — MORPHINE SULFATE PRN MG: 2 INJECTION, SOLUTION INTRAMUSCULAR; INTRAVENOUS at 17:36

## 2017-12-13 RX ADMIN — CEFTRIAXONE SCH MLS/HR: 1 INJECTION, SOLUTION INTRAVENOUS at 14:14

## 2017-12-13 RX ADMIN — MORPHINE SULFATE SCH MG: 2 INJECTION, SOLUTION INTRAMUSCULAR; INTRAVENOUS at 04:15

## 2017-12-13 RX ADMIN — SODIUM CHLORIDE PRN MG: 0.9 INJECTION, SOLUTION INTRAVENOUS at 10:45

## 2017-12-13 RX ADMIN — SODIUM CHLORIDE PRN MG: 0.9 INJECTION, SOLUTION INTRAVENOUS at 10:24

## 2017-12-13 RX ADMIN — HYDROMORPHONE HYDROCHLORIDE PRN MG: 1 INJECTION, SOLUTION INTRAMUSCULAR; INTRAVENOUS; SUBCUTANEOUS at 09:34

## 2017-12-13 RX ADMIN — VANCOMYCIN HYDROCHLORIDE SCH MLS/HR: 1 INJECTION, POWDER, LYOPHILIZED, FOR SOLUTION INTRAVENOUS at 16:08

## 2017-12-13 RX ADMIN — HYDROMORPHONE HYDROCHLORIDE PRN MG: 1 INJECTION, SOLUTION INTRAMUSCULAR; INTRAVENOUS; SUBCUTANEOUS at 10:23

## 2017-12-13 RX ADMIN — HYDROMORPHONE HYDROCHLORIDE PRN MG: 1 INJECTION, SOLUTION INTRAMUSCULAR; INTRAVENOUS; SUBCUTANEOUS at 09:50

## 2017-12-13 RX ADMIN — MORPHINE SULFATE SCH MG: 2 INJECTION, SOLUTION INTRAMUSCULAR; INTRAVENOUS at 00:05

## 2017-12-13 RX ADMIN — PANTOPRAZOLE SODIUM SCH: 40 TABLET, DELAYED RELEASE ORAL at 06:22

## 2017-12-13 RX ADMIN — CALCIUM CARBONATE-CHOLECALCIFEROL TAB 250 MG-125 UNIT SCH TAB: 250-125 TAB at 18:09

## 2017-12-13 NOTE — CP.PCM.PN
<Rodney Mclean Alec - Last Filed: 12/13/17 13:09>





Subjective





- Date & Time of Evaluation


Date of Evaluation: 12/13/17


Time of Evaluation: 13:09





- Subjective


Subjective: 


Medicine Progress Note - Rodney Mclean DO, Intern





Patient seen and examined at bedside.  Per nursing, patient fell and broke her 

left arm last night while using the toilet.  Patient also smoked a cigarette in 

the bathroom and was told to stop.  At this time patient states that she has 

pain in her leg.  She also states that she wants to be allowed to go to the 

bathroom by herself rather than bedside commode.  Other than that, patient 

denies any other complaints including fever, chills, shortness of breath, chest 

pain, nausea, vomiting, or diarrhea.





Of note, patient's sister called and stated that the patient has been having 

suicidal ideation for the past 2 months and states that she is going to 

overdose on her prescription medications.  Dr. Vladez is on consult.





Objective





- Vital Signs/Intake and Output


Vital Signs (last 24 hours): 


 











Temp Pulse Resp BP Pulse Ox


 


 98.6 F   85   13   161/95 H  100 


 


 12/13/17 11:30  12/13/17 12:00  12/13/17 12:00  12/13/17 12:00  12/13/17 12:00











- Medications


Medications: 


 Current Medications





Acetaminophen (Tylenol 325mg Tab)  650 mg PO Q4 PRN


   PRN Reason: Fever >100.4 F


Hydromorphone HCl (Dilaudid)  0.5 mg IVP Q5M PRN


   PRN Reason: Pain, severe (8-10)


   Last Admin: 12/13/17 10:23 Dose:  0.5 mg


Sodium Chloride (Sodium Chloride 0.9%)  1,000 mls @ 100 mls/hr IV .Q10H RON


   Last Admin: 12/13/17 01:29 Dose:  100 mls/hr


Ceftriaxone Sodium (Rocephin 1 Gram Ivpb (D5w))  1 gm in 100 mls @ 100 mls/hr 

IVPB DAILY [facility]


   PRN Reason: Protocol


   Stop: 12/13/17 23:59


   Last Admin: 12/12/17 14:54 Dose:  100 mls/hr


Vancomycin HCl (Vancomycin 1gm)  1 gm in 250 mls @ 167 mls/hr IVPB DAILY [facility]


   PRN Reason: Protocol


   Stop: 12/13/17 23:59


   Last Admin: 12/12/17 16:06 Dose:  167 mls/hr


Potassium Chloride (Potassium Chloride 20 Meq/100 Ml)  20 meq in 100 mls @ 50 

mls/hr IVPB Q2H [facility]


   Stop: 12/13/17 15:14


Lorazepam (Ativan)  1 mg IVP Q4 PRN; Protocol


   PRN Reason: Anxiety


   Last Admin: 12/12/17 18:16 Dose:  1 mg


Morphine Sulfate (Morphine)  2 mg IVP Q4 RON


   Last Admin: 12/13/17 13:07 Dose:  2 mg


Nicotine (Nicoderm Cq)  1 patch TD DAILY [facility]


   Last Admin: 12/12/17 16:37 Dose:  1 patch


Ondansetron HCl (Zofran Inj)  4 mg IVP Q4H PRN


   PRN Reason: Nausea/Vomiting


Ondansetron HCl (Zofran Inj)  4 mg IVP ONCE PRN


   PRN Reason: Nausea/Vomiting


Pantoprazole Sodium (Protonix Ec Tab)  40 mg PO 0600 [facility]


   Last Admin: 12/13/17 06:22 Dose:  Not Given











- Labs


Labs: 


 











PT  20.3 SECONDS (9.4-12.5)  H  12/13/17  06:00    


 


INR  1.82  (0.93-1.08)  H  12/13/17  06:00    


 


APTT  28.0 Seconds (25.1-36.5)   12/13/17  06:00    














- Constitutional


Appears: Well





- Head Exam


Head Exam: ATRAUMATIC, NORMAL INSPECTION, NORMOCEPHALIC





- Eye Exam


Eye Exam: EOMI, Normal appearance, PERRL


Pupil Exam: NORMAL ACCOMODATION, PERRL





- ENT Exam


ENT Exam: Mucous Membranes Moist, Normal Exam





- Neck Exam


Neck Exam: Full ROM, Normal Inspection.  absent: Lymphadenopathy





- Respiratory Exam


Respiratory Exam: Clear to Ausculation Bilateral, NORMAL BREATHING PATTERN.  

absent: Decreased Breath Sounds





- Cardiovascular Exam


Cardiovascular Exam: REGULAR RHYTHM, +S1, +S2.  absent: Tachycardia, Murmur





- GI/Abdominal Exam


GI & Abdominal Exam: Soft, Normal Bowel Sounds.  absent: Rigid, Tenderness





- Extremities Exam


Extremities Exam: Full ROM, Normal Capillary Refill.  absent: Joint Swelling, 

Pedal Edema (Patient's Right lower extremity is in a cast, but she has distal 

sensation and no loss of motor function.  Warm)





- Back Exam


Back Exam: NORMAL INSPECTION





- Neurological Exam


Neurological Exam: Alert, Awake, CN II-XII Intact, Normal Gait, Oriented x3





- Psychiatric Exam


Psychiatric exam: Agitated, Anxious





- Skin


Skin Exam: Dry, Intact, Normal Color, Warm





Assessment and Plan





- Assessment and Plan (Free Text)


Assessment: 


A/P





Patient is a 37 year old female with past medical history of osteoporosis who 

presents to the emergency department via EMS for evaluation and treatment 

status post fall. 





Fractures secondary to fall


- Urinalysis: Trace protein, Moderate blood


- Urine Drug Screen not performed


- Regular diet after surgical procedure


- IVF Normal Saline @ 100


- Orthopedic consult: Dr. Christianson


   * Performing external fixator surgery today, as well as fixated her arm in a 

cast


- Pain control: morphine 2 mg q4 prn pain 





Leukocytosis, likely secondary to Infection VS Reactive


- ID Consult: Dr. Mejia


   * Leukocytosis most likely due to ankle fracture


- 1 dose of vanc and rocephin


   * Discontinue after today


- Procalcitonin negative





Hypokalemia, likely secondary to alcohol consumption


- 3.0 today, replete after surgery


- Mg normal





Elevated INR likely secondary to Coagulopathy VS EtOH consumption


- No acute intervention





Tobacco Abuse


- Nicotine patch given


- Smoking cessation advised, dangers of tobacco delineated





Prophylaxis


- DVT -no SCD as patient/s right Lower Extremity is wrapped and splinted, no 

heparin due to surgery.  Will change after surgery.


- GI - protonix





<Hugh Phelps - Last Filed: 12/13/17 17:56>





Objective





- Vital Signs/Intake and Output


Vital Signs (last 24 hours): 


 











Temp Pulse Resp BP Pulse Ox


 


 98.6 F   85   13   161/95 H  100 


 


 12/13/17 11:30  12/13/17 12:00  12/13/17 12:00  12/13/17 12:00  12/13/17 12:00











- Medications


Medications: 


 Current Medications





Acetaminophen (Tylenol 325mg Tab)  650 mg PO Q4 PRN


   PRN Reason: Fever >100.4 F


Aspirin (Aspirin Chewable)  81 mg PO DAILY RON


Calcium/Vitamin D (Oscal-D 250 Mg-125 Units Tab)  1 tab PO DAILY RON


Escitalopram Oxalate (Lexapro)  10 mg PO DAILY RON


Hydromorphone HCl (Dilaudid)  0.5 mg IVP Q5M PRN


   PRN Reason: Pain, severe (8-10)


   Last Admin: 12/13/17 10:23 Dose:  0.5 mg


Sodium Chloride (Sodium Chloride 0.9%)  1,000 mls @ 100 mls/hr IV .Q10H [facility]


   Last Admin: 12/13/17 01:29 Dose:  100 mls/hr


Ceftriaxone Sodium (Rocephin 1 Gram Ivpb (D5w))  1 gm in 100 mls @ 100 mls/hr 

IVPB DAILY [facility]


   PRN Reason: Protocol


   Stop: 12/13/17 23:59


   Last Admin: 12/13/17 14:14 Dose:  100 mls/hr


Vancomycin HCl (Vancomycin 1gm)  1 gm in 250 mls @ 167 mls/hr IVPB DAILY [facility]


   PRN Reason: Protocol


   Stop: 12/13/17 23:59


   Last Admin: 12/13/17 16:08 Dose:  167 mls/hr


Lorazepam (Ativan)  1 mg IVP Q4 PRN; Protocol


   PRN Reason: Anxiety


   Last Admin: 12/12/17 18:16 Dose:  1 mg


Morphine Sulfate (Morphine)  2 mg IVP Q4H PRN


   PRN Reason: Pain, severe (8-10)


   Last Admin: 12/13/17 17:36 Dose:  2 mg


Nicotine (Nicoderm Cq)  1 patch TD DAILY [facility]


   Last Admin: 12/13/17 17:42 Dose:  1 patch


Ondansetron HCl (Zofran Inj)  4 mg IVP Q4H PRN


   PRN Reason: Nausea/Vomiting


Ondansetron HCl (Zofran Inj)  4 mg IVP ONCE PRN


   PRN Reason: Nausea/Vomiting


Pantoprazole Sodium (Protonix Ec Tab)  40 mg PO 0600 [facility]


   Last Admin: 12/13/17 06:22 Dose:  Not Given











- Labs


Labs: 


 











PT  20.3 SECONDS (9.4-12.5)  H  12/13/17  06:00    


 


INR  1.82  (0.93-1.08)  H  12/13/17  06:00    


 


APTT  28.0 Seconds (25.1-36.5)   12/13/17  06:00    














Attending/Attestation





- Attestation


I have personally seen and examined this patient.: Yes


I have fully participated in the care of the patient.: Yes


I have reviewed all pertinent clinical information, including history, physical 

exam and plan: Yes


Notes (Text): 





12/13/17 17:45





attending note;





Patient seen and examined with resident.


patient's boyfriend by the bedside.





Patient is a 37-year-old female with a past medical history of chronic opiate 

dependency, history of left leg injury is admitted with right ankle fracture.


status post external fixator by orthopedics  today.


Continue IV morphine when necessary.


plan for internal fixation in 2 weeks.


Non-weight bearing on the right leg. PT evaluation requested by orthopedics for 

transfer on commode use.





Leukocytosis;Resolving. no source of infection. Possibly reactive.


Chest x-rays negative. UA is negative.


 on vancomycin and Rocephin.


ID evaluation appreciated.





chronic opiate dependency; patient has been on opiates for more than 10 years.





History of anxiety and depression; continue Ativan when necessary. patient has 

episodes of agitation.


Psychiatric evaluation appreciated. Questionable history of suicidal ideation 

in the past.


But patient denies any suicidal ideation now. Case discussed with psychiatrist 

in detail.


patient name her ham as next of kin.





Active smoking; smoking cessation is strongly advised. Started on Nicoerm 

patch. patient was apparently smoking in the bathroom.


Explained in detail about  no smoking policy in the hospital.nursing staff 

informed.





Left wrist fracture; after a fall. Currently on a cast.





history of osteoporosis; continue calcium and vitamin D. Might need outpatient 

DEXA scan.


 Patient said she follows up with endocrinology for metabolic issues.





patient is strongly advised to continue workup with PMD and endocrinology as 

outpatient to rule out other causes of bone abnormality.





patient's aggressive behaviour towards staff was explained to the next of kin.


Currently patient is staying calm.


Agreed to follow our recommendations.





Monitor closely. Nursing staff informed. Desk side bed/24-hour video 

monitoring. time spent taking care of patient's behavioral issues over 45 

minutes.





prognosis is poor secondary to chronic opiate use, behavioral issues and 

noncompliance with follow-up.





upon discharge the patient will follow up with PMD Dr. Ameena Michel.


12/13/17 17:55

## 2017-12-13 NOTE — OP
DATE OF SURGERY:  12/13/2017



ORTHOPEDIC SURGERY REPORT



SUMMARY:  The patient is a 37-year-old female who slipped and fell, to

bring her in emergency room on 12/11/2017, signed out from the emergency

room that evening with a pilon fracture of her right ankle, mildly

displaced lateral malleolus and intra-articular fracture, distal tibia. 

She came back on 12/12/2017, was readmitted and then signed out again, but

she apparently did come back.  She has a history of drug abuse.  Then

today, on 12/13/2017, I had consented her to have surgery on the right

distal tibia for application of a transarticular external fixator, which

was done in general anesthesia endotracheal tube.  So, the preoperative

diagnosis was pilon fracture of her right distal tibia and fracture of

lateral malleolus and a fracture of the left distal radius that she

incurred when she was against advice went to the bathroom this morning and

slipped and fell and fractured her left distal radius.  X-ray showed

minimally displaced fracture of left radius.  So, we did the surgery with

general anesthesia endotracheal tube after an ECG was done.  So the pilon

fracture was handled with a [employer] external fixator transarticular by two

pins in the tibia and one pin in the calcaneus with the foot applied in

dorsiflexion, and two carbon fiber bars were applied to the three pins with

the help of the outriggers, improved position, we will wait for this

swelling to subside and do a formal ORIF in about 10 days.  The left wrist

fracture, minimally displaced was treated with a short-arm cast while she

was under anesthesia.  Her ECG was negative and thus patient had no

problems with the surgical procedure.  Anesthesia went well and she will go

to the recovery room and I will follow her closely.  Hopefully, I could

send her home in a couple of days with a wheelchair because she is unable

to ambulate with the left wrist fracture and the right ankle fracture.  I

have to find her a safe place to go and before I bring her back, I have to

check her ankle once in a while to make sure it is not infected.



FINAL DIAGNOSES:  Pilon fracture, right distal tibia and fibula and left

wrist fracture.





__________________________________________

Tyrel Christianson DO





DD:  12/13/2017 9:20:09

DT:  12/13/2017 11:38:50

Job # [id number]

## 2017-12-13 NOTE — RAD
PROCEDURE:  Fluoroscopy up to 1 hour



HISTORY:

R/O LT WRIST FX



COMPARISON:





TECHNIQUE:

Fluoroscopy was provided in the operating room.  4 seconds of fluoro 

time. 3 images of the wrist were submitted



FINDINGS:

There is a transverse nondisplaced fracture of the distal radius



IMPRESSION:

As above

## 2017-12-13 NOTE — CON
DATE:  12/12/2017



HISTORY OF PRESENT ILLNESS:  I was called to see the patient on 12/12/2017

for pilon fracture of her right distal tibia with a lateral malleolar

fracture both displaced and intraarticular.  The patient is right now

obtunded from a drug that she is on at home being a drug addict and

alcohol.  So she was combative in the ER and she signed out yesterday and

now signed back in, now she wants to sign back out again in the time of

12/12/2017.  She is not coherent enough to get a consent for surgery.  I

told her she needs an external fixator to temporize the displaced fracture

and when the swelling goes down she could undergo open reduction and

internal fixation, but she is absolutely not in any condition, mentally or

physically to have any type of open reduction or even external fixator she

could be a danger to herself if that is done when she is not cooperative,

which is not right now.  She does not want to make eye contact, turns away

when she is spoken to, and she is very combative as well as appears to be

in an any undo pain.



PLAN:  So when the patient becomes coherent we could do our external

fixator hopefully tomorrow 7:30 if scheduled for the morning, but it is

going to be a tough throw because if she goes back in reverts to overdosing

with drugs and she could be a danger to other people that are going to care

of her especially an external fixator on which she could potentially injure

people like thrashing around and hurting people especially those pins,

pokes somebody in the face or eye that are in her right leg.  So I do not

want to operate on her under those circumstances.  She might have to be

ideally admitted to a psych og first and then take care of when she is

lucid and cooperative and understands the risks and benefit of the major

fracture like this and major surgery.  Where if she does not do the right

thing she could definitely hurt other people.  So we will wait and see if

she is in no shape to give any kind of consent and I also reevaluate her in

the morning and still keep the operative time open for 7:30 a.m. for

external fixator.





__________________________________________

Edward Mastromonaco, DO





DD:  12/12/2017 13:22:32

DT:  12/12/2017 15:02:06

Job # [id number]

## 2017-12-13 NOTE — RAD
PROCEDURE:  Fluoroscopy up to 1 hour



HISTORY:

RT ANKLE



COMPARISON:





TECHNIQUE:

Fluoroscopy was provided in the operating room. 47.7 seconds of 

fluoro time were used. 5 images were submitted



FINDINGS:

The study shows placement of external fixation device. There is 

anatomic alignment at the ankle joint



IMPRESSION:

As above

## 2017-12-13 NOTE — CP.PCM.PN
Subjective





- Date & Time of Evaluation


Date of Evaluation: 12/13/17


Time of Evaluation: 22:45





- Subjective


Subjective: 


Infectious Disease Follow Up:


December 13, 2017





36 yo female with history of falls with pain in the right ankle.  Patient with 

leukocytosis.  She is not cooperative with exam or interview.  She curses alot 

during interactions with her.  Scheduled for OR this morning.  She is seeing a 

neurologist as an outpatient for an unsteady gait.  Leukocytosis of 18.9.  This 

can be secondary to the fracture in her right ankle.





The patient apparently fell in the bathroom overnight fracturing the left wrist 

specifically the left distal radius.  External fixator placed on the right 

ankle by Dr. Christianson.  Remains on IV antibiotics.





Objective





- Vital Signs/Intake and Output


Vital Signs (last 24 hours): 


 











Temp Pulse Resp BP Pulse Ox


 


 98.6 F   85   13   161/95 H  100 


 


 12/13/17 11:30  12/13/17 12:00  12/13/17 12:00  12/13/17 12:00  12/13/17 12:00








Intake and Output: 


 











 12/13/17 12/14/17





 18:59 06:59


 


Intake Total  900


 


Balance  900














- Medications


Medications: 


 Current Medications





Acetaminophen (Tylenol 325mg Tab)  650 mg PO Q4 PRN


   PRN Reason: Fever >100.4 F


Aspirin (Aspirin Chewable)  81 mg PO DAILY [facility]


Calcium/Vitamin D (Oscal-D 250 Mg-125 Units Tab)  1 tab PO DAILY [facility]


   Last Admin: 12/13/17 18:09 Dose:  1 tab


Escitalopram Oxalate (Lexapro)  10 mg PO DAILY [facility]


Hydromorphone HCl (Dilaudid)  0.5 mg IVP Q5M PRN


   PRN Reason: Pain, severe (8-10)


   Last Admin: 12/13/17 10:23 Dose:  0.5 mg


Sodium Chloride (Sodium Chloride 0.9%)  1,000 mls @ 100 mls/hr IV .Q10H [facility]


   Last Admin: 12/13/17 18:05 Dose:  100 mls/hr


Ceftriaxone Sodium (Rocephin 1 Gram Ivpb (D5w))  1 gm in 100 mls @ 100 mls/hr 

IVPB DAILY [facility]


   PRN Reason: Protocol


   Stop: 12/13/17 23:59


   Last Admin: 12/13/17 14:14 Dose:  100 mls/hr


Vancomycin HCl (Vancomycin 1gm)  1 gm in 250 mls @ 167 mls/hr IVPB DAILY [facility]


   PRN Reason: Protocol


   Stop: 12/13/17 23:59


   Last Admin: 12/13/17 16:08 Dose:  167 mls/hr


Lorazepam (Ativan)  1 mg IVP Q4 PRN; Protocol


   PRN Reason: Anxiety


   Last Admin: 12/13/17 20:10 Dose:  1 mg


Morphine Sulfate (Morphine)  2 mg IVP Q4H PRN


   PRN Reason: Pain, severe (8-10)


   Last Admin: 12/13/17 20:58 Dose:  2 mg


Nicotine (Nicoderm Cq)  1 patch TD DAILY [facility]


   Last Admin: 12/13/17 17:42 Dose:  1 patch


Ondansetron HCl (Zofran Inj)  4 mg IVP Q4H PRN


   PRN Reason: Nausea/Vomiting


Ondansetron HCl (Zofran Inj)  4 mg IVP ONCE PRN


   PRN Reason: Nausea/Vomiting


Pantoprazole Sodium (Protonix Ec Tab)  40 mg PO 0600 [facility]


   Last Admin: 12/13/17 06:22 Dose:  Not Given











- Labs


Labs: 


 











PT  20.3 SECONDS (9.4-12.5)  H  12/13/17  06:00    


 


INR  1.82  (0.93-1.08)  H  12/13/17  06:00    


 


APTT  28.0 Seconds (25.1-36.5)   12/13/17  06:00    














- Constitutional


Appears: Non-toxic, No Acute Distress, Chronically Ill





- Head Exam


Head Exam: ATRAUMATIC, NORMOCEPHALIC





- Eye Exam


Eye Exam: EOMI, PERRL


Pupil Exam: NORMAL ACCOMODATION, PERRL





- ENT Exam


ENT Exam: Mucous Membranes Moist, Normal External Ear Exam, TM's Normal 

Bilaterally





- Neck Exam


Neck Exam: Full ROM, Normal Inspection





- Respiratory Exam


Respiratory Exam: Clear to Ausculation Bilateral, NORMAL BREATHING PATTERN





- Cardiovascular Exam


Cardiovascular Exam: REGULAR RHYTHM, RRR, +S1, +S2





- GI/Abdominal Exam


GI & Abdominal Exam: Soft, Normal Bowel Sounds.  absent: Distended, Tenderness





- Extremities Exam


Additional comments: 


left radial fracture.  External fixator in right leg/ankle.





- Neurological Exam


Neurological Exam: Alert, Awake, CN II-XII Intact, Oriented x3





- Psychiatric Exam


Additional comments: 


irritable





- Skin


Skin Exam: Normal Color, Warm





Assessment and Plan





- Assessment and Plan (Free Text)


Assessment: 





36 yo female with right ankle fracture taken to OR for external fixator 

placement.  The patient also broke her left distal radius from a fall while 

going to the bathroom.  The patient has not followed instructions especially 

when told that she could not go to the bathroom without aid and continues to 

place herself in risky positions due to her stubbornness and/or lack of 

understanding of her medical issues.





Case discussed with Dr. Christianson this morning.





Continue IV antibiotics for now





Supportive care.





Thank you for allowing me to participate in the care of the patient, we will 

follow with you.

## 2017-12-13 NOTE — CARD
--------------- APPROVED REPORT --------------





EKG Measurement

Heart [id number]

ND [id number]

[id number]

[id number]

[id number]



<Conclusion>

Normal sinus rhythm with sinus arrhythmia

Septal infarct, age undetermined

Abnormal ECG

## 2017-12-14 VITALS — SYSTOLIC BLOOD PRESSURE: 157 MMHG | DIASTOLIC BLOOD PRESSURE: 106 MMHG | HEART RATE: 87 BPM | RESPIRATION RATE: 16 BRPM

## 2017-12-14 VITALS — TEMPERATURE: 98.7 F | OXYGEN SATURATION: 96 %

## 2017-12-14 LAB
ALBUMIN/GLOB SERPL: 1.4 {RATIO} (ref 1.1–1.8)
ALP SERPL-CCNC: 77 U/L (ref 38–126)
ALT SERPL-CCNC: 25 U/L (ref 7–56)
AST SERPL-CCNC: 29 U/L (ref 14–36)
BASOPHILS # BLD AUTO: 0.06 K/MM3 (ref 0–2)
BASOPHILS NFR BLD: 0.5 % (ref 0–3)
BILIRUB SERPL-MCNC: 1.1 MG/DL (ref 0.2–1.3)
BUN SERPL-MCNC: 6 MG/DL (ref 7–21)
CALCIUM SERPL-MCNC: 9.1 MG/DL (ref 8.4–10.5)
CHLORIDE SERPL-SCNC: 109 MMOL/L (ref 98–107)
CO2 SERPL-SCNC: 22 MMOL/L (ref 21–33)
EOSINOPHIL # BLD: 0.2 10*3/UL (ref 0–0.7)
EOSINOPHIL NFR BLD: 1.7 % (ref 1.5–5)
ERYTHROCYTE [DISTWIDTH] IN BLOOD BY AUTOMATED COUNT: 13 % (ref 11.5–14.5)
GLOBULIN SER-MCNC: 2.8 GM/DL
GLUCOSE SERPL-MCNC: 114 MG/DL (ref 70–110)
GRANULOCYTES # BLD: 9.01 10*3/UL (ref 1.4–6.5)
GRANULOCYTES NFR BLD: 71.2 % (ref 50–68)
HCT VFR BLD CALC: 42.3 % (ref 36–48)
LYMPHOCYTES # BLD: 2.5 10*3/UL (ref 1.2–3.4)
LYMPHOCYTES NFR BLD AUTO: 20 % (ref 22–35)
MCH RBC QN AUTO: 31.7 PG (ref 25–35)
MCHC RBC AUTO-ENTMCNC: 34.3 G/DL (ref 31–37)
MCV RBC AUTO: 92.4 FL (ref 80–105)
MONOCYTES # BLD AUTO: 0.8 10*3/UL (ref 0.1–0.6)
MONOCYTES NFR BLD: 6.6 % (ref 1–6)
PHOSPHATE SERPL-MCNC: 3.1 MG/DL (ref 2.5–4.5)
PLATELET # BLD: 177 10^3/UL (ref 120–450)
PMV BLD AUTO: 11.3 FL (ref 7–11)
POTASSIUM SERPL-SCNC: 3.8 MMOL/L (ref 3.6–5)
PROT SERPL-MCNC: 7 G/DL (ref 5.8–8.3)
SODIUM SERPL-SCNC: 145 MMOL/L (ref 132–148)
WBC # BLD AUTO: 12.6 10^3/UL (ref 4.5–11)

## 2017-12-14 RX ADMIN — CALCIUM CARBONATE-CHOLECALCIFEROL TAB 250 MG-125 UNIT SCH TAB: 250-125 TAB at 11:33

## 2017-12-14 RX ADMIN — MORPHINE SULFATE PRN MG: 2 INJECTION, SOLUTION INTRAMUSCULAR; INTRAVENOUS at 13:33

## 2017-12-14 RX ADMIN — MORPHINE SULFATE PRN MG: 2 INJECTION, SOLUTION INTRAMUSCULAR; INTRAVENOUS at 03:21

## 2017-12-14 RX ADMIN — PANTOPRAZOLE SODIUM SCH MG: 40 TABLET, DELAYED RELEASE ORAL at 05:53

## 2017-12-14 NOTE — CT
PROCEDURE:  CT of the right ankle



HISTORY:

pilon fx rt ankle



COMPARISON:





TECHNIQUE:

Contiguous axial images were obtained through the ankle without 

intravenous contrast enhancement. Sagittal and coronal 

reconstructions were performed.



Radiation dose (DLP): 190 mGy-cm.  



This CT exam was performed using one or more of the following dose 

reduction techniques: Automated exposure control, adjustment of the 

mA and/or kV according to patient size, and/or use of iterative 

reconstruction technique.



FINDINGS:

Severe displaced comminuted fractures are seen of the tibia and 

fibula. The tibial fracture has a transverse component that extends 

across the width of the distal tibia.  There is also a vertical 

component in the coronal plane that extends into the articular 

surface. This is best seen on sagittal image 32 series 604. The 

transverse component is best seen on coronal image 31 series 605.



There is a transverse displaced comminuted fracture of the distal 

fibula just above the level of the ankle joint. Multiple small bony 

fragments are seen on axial image 103 series 2.



There is no evidence of dislocation. The talar dome is intact.



IMPRESSION:

Comminuted intra-articular fracture of the distal tibia.  Comminuted 

displaced fracture of the distal fibula

## 2017-12-14 NOTE — CP.PCM.DIS
<Rodney Mclean - Last Filed: 12/14/17 21:38>





Provider





- Provider


Date of Admission: 


12/13/17 09:59





Attending physician: 


Hugh Phelps MD





Primary care physician: 


Ameena Michel MD





Consults: 


Ortho: Dr. Christianson


Psych: Dr. Valdez


Time Spent in preparation of Discharge (in minutes): 45





Hospital Course





- Lab Results


Lab Results: 


 Most Recent Lab Values











WBC  12.6 10^3/ul (4.5-11.0)  H  12/14/17  05:30    


 


RBC  4.58 10^6/uL (3.5-6.1)   12/14/17  05:30    


 


Hgb  14.5 g/dL (12.0-16.0)   12/14/17  05:30    


 


Hct  42.3 % (36.0-48.0)   12/14/17  05:30    


 


MCV  92.4 fl (80.0-105.0)   12/14/17  05:30    


 


MCH  31.7 pg (25.0-35.0)   12/14/17  05:30    


 


MCHC  34.3 g/dl (31.0-37.0)   12/14/17  05:30    


 


RDW  13.0 % (11.5-14.5)   12/14/17  05:30    


 


Plt Count  177 10^3/uL (120.0-450.0)   12/14/17  05:30    


 


MPV  11.3 fl (7.0-11.0)  H  12/14/17  05:30    


 


Gran %  71.2 % (50.0-68.0)  H  12/14/17  05:30    


 


Lymph % (Auto)  20.0 % (22.0-35.0)  L  12/14/17  05:30    


 


Mono % (Auto)  6.6 % (1.0-6.0)  H  12/14/17  05:30    


 


Eos % (Auto)  1.7 % (1.5-5.0)   12/14/17  05:30    


 


Baso % (Auto)  0.5 % (0.0-3.0)   12/14/17  05:30    


 


Gran #  9.01  (1.4-6.5)  H  12/14/17  05:30    


 


Lymph #  2.5  (1.2-3.4)   12/14/17  05:30    


 


Mono #  0.8  (0.1-0.6)  H  12/14/17  05:30    


 


Eos #  0.2  (0.0-0.7)   12/14/17  05:30    


 


Baso #  0.06 K/mm3 (0.0-2.0)   12/14/17  05:30    


 


PT  20.3 SECONDS (9.4-12.5)  H  12/13/17  06:00    


 


INR  1.82  (0.93-1.08)  H  12/13/17  06:00    


 


APTT  28.0 Seconds (25.1-36.5)   12/13/17  06:00    


 


Sodium  145 mmol/L (132-148)   12/14/17  05:30    


 


Potassium  3.8 mmol/L (3.6-5.0)   12/14/17  05:30    


 


Chloride  109 mmol/L ()  H  12/14/17  05:30    


 


Carbon Dioxide  22 mmol/L (21-33)   12/14/17  05:30    


 


Anion Gap  18  (10-20)   12/14/17  05:30    


 


BUN  6 mg/dL (7-21)  L  12/14/17  05:30    


 


Creatinine  0.6 mg/dl (0.7-1.2)  L  12/14/17  05:30    


 


Est GFR ( Amer)  > 60   12/14/17  05:30    


 


Est GFR (Non-Af Amer)  > 60   12/14/17  05:30    


 


Random Glucose  114 mg/dL ()  H  12/14/17  05:30    


 


Calcium  9.1 mg/dL (8.4-10.5)   12/14/17  05:30    


 


Phosphorus  3.1 mg/dL (2.5-4.5)   12/14/17  05:30    


 


Total Bilirubin  1.1 mg/dL (0.2-1.3)   12/14/17  05:30    


 


AST  29 U/L (14-36)   12/14/17  05:30    


 


ALT  25 U/L (7-56)   12/14/17  05:30    


 


Alkaline Phosphatase  77 U/L ()   12/14/17  05:30    


 


Total Protein  7.0 g/dL (5.8-8.3)   12/14/17  05:30    


 


Albumin  4.1 g/dL (3.0-4.8)   12/14/17  05:30    


 


Globulin  2.8 gm/dL  12/14/17  05:30    


 


Albumin/Globulin Ratio  1.4  (1.1-1.8)   12/14/17  05:30    


 


25-OH Vitamin D Total  20.5 NG/ML (30.0-100.0)  L  12/14/17  05:30    


 


Procalcitonin  0.11 NG/ML (0.19-0.49)  L  12/12/17  10:23    


 


Urine Color  Yellow  (YELLOW)   12/12/17  19:18    


 


Urine Appearance  Slight-cloudy  (CLEAR)   12/12/17  19:18    


 


Urine pH  6.5  (4.7-8.0)   12/12/17  19:18    


 


Ur Specific Gravity  1.025  (1.005-1.035)   12/12/17  19:18    


 


Urine Protein  Trace mg/dL (<30 mg/dL)  H  12/12/17  19:18    


 


Urine Glucose (UA)  Negative mg/dL (NEGATIVE)   12/12/17  19:18    


 


Urine Ketones  15 mg/dL (NEGATIVE)  H  12/12/17  19:18    


 


Urine Blood  Moderate  (NEGATIVE)  H  12/12/17  19:18    


 


Urine Nitrate  Negative  (NEGATIVE)   12/12/17  19:18    


 


Urine Bilirubin  Negative  (NEGATIVE)   12/12/17  19:18    


 


Urine Urobilinogen  0.2 E.U./dL (<1 E.U./dL)   12/12/17  19:18    


 


Ur Leukocyte Esterase  Negative [place]/uL (NEGATIVE)   12/12/17  19:18    


 


Urine RBC  10 - 15 /hpf (0-2)   12/12/17  19:18    


 


Urine WBC  1 - 3 /hpf (0-6)   12/12/17  19:18    


 


Ur Epithelial Cells  1 - 3 /hpf (0-5)   12/12/17  19:18    


 


Urine Bacteria  Few  (NEG)   12/12/17  19:18    


 


Urine HCG, Qual  Negative  (NEGATIVE)   12/13/17  07:50    


 


Urine Opiates Screen  Positive  (NEGATIVE)  H  12/14/17  08:42    


 


Urine Methadone Screen  Negative  (NEGATIVE)   12/14/17  08:42    


 


Ur Barbiturates Screen  Negative  (NEGATIVE)   12/14/17  08:42    


 


Ur Phencyclidine Scrn  Negative  (NEGATIVE)   12/14/17  08:42    


 


Ur Amphetamines Screen  Negative  (NEGATIVE)   12/14/17  08:42    


 


U Benzodiazepines Scrn  Positive  (NEGATIVE)   12/14/17  08:42    


 


U Oth Cocaine Metabols  Negative  (NEGATIVE)   12/14/17  08:42    


 


U Cannabinoids Screen  Negative  (NEGATIVE)   12/14/17  08:42    














- Hospital Course


Hospital Course: 


37 year old female with a history of osteoporosis and substance abuse presented 

after a mechanical fall on 12/11/17, and she was admitted, but signed out AMA 

on 12/12/17.  She was then readmitted the following day, when she returned due 

to the pain.  Patient's story was confusing - the day before, she had stated 

that she fell down stairs, and when she returned, she stated that she was 

running after her son and tripped and fell.  In either case, it seemed that it 

was a mechanical fall.  Patient does state, however, that she has been unsteady 

on her feet as of late, and that she sees a neurologist out patient, Dr. García.

  





Throughout the course of her hospital stay, she had several imaging studies 

performed:


Chest XR 12/12/17: no acute disease


Ankle XR 12/11/17: Showed a pilon fracture


Ankle XR 12/14/17: Showed a noncomminuted fracture


Lower Extremity CT 12/14/17: Same as above


Wrist X-Ray 12/14/17: Showed a non displaced fracture.





While in the hospital, the patient was combative with staff and was in a great 

deal of pain.  While in the hospital, patient got out of bed against staff 

instructions, and fell and broke her wrist, at which point she was placed on an 

JA monitoring system.  Dr. Christianson performed an external fixator on the 

ankle fracture, cast her right wrist, and patient was given morphine and 

dilaudid for her pain.  Dr. Christianson recommended discharging the patient 

home with a wheelchair and bedside commode, and PT agreed.  It was confirmed 

that patient had purchased these items before she was discharged home, and Dr. Christianson recommended follow up in 10-14 days for another surgery.





With respect to her agitation and substance abuse, psychiatry was consulted.  

They determined that she has no Suicidal or homicidal ideations, and cleared 

her for discharge.





Patient also had a leukocytosis, which was deemed as a reactive leukocytosis to 

the ankle fracture.  It improved throughout her stay.





Of note, patient's PMD was contacted to follow up on patient's osteoporosis 

work up.





Diagnoses:





Fractures secondary to falls





Leukocytosis secondary to reactive





Elevated INR secondary to Coagulopathy vs alcohol consumption





Hypokalemia - repleted





Tobacco Abuse





Substance abuse





Discharge Exam





- Head Exam


Head Exam: ATRAUMATIC, NORMAL INSPECTION, NORMOCEPHALIC





- Eye Exam


Eye Exam: EOMI, Normal appearance, PERRL


Pupil Exam: NORMAL ACCOMODATION, PERRL





- ENT Exam


ENT Exam: Normal Exam, Normal External Ear Exam.  absent: Mucous Membranes Dry





- Neck Exam


Neck exam: Full Rom, Normal Inspection





- Respiratory Exam


Respiratory Exam: Clear to PA & Lateral, NORMAL BREATHING PATTERN, 

UNREMARKABLE.  absent: Rales, Rhonchi, Wheezes





- Cardiovascular Exam


Cardiovascular Exam: REGULAR RHYTHM, +S1, +S2.  absent: Tachycardia, Clicks, 

Gallop





- GI/Abdominal Exam


GI & Abdominal Exam: Normal Bowel Sounds, Soft.  absent: Diminished Bowel Sounds

, Distended, Firm, Rebound, Tenderness





- Rectal Exam


Rectal Exam: NORMAL INSPECTION





-  Exam


 Exam: Circumcision, NORMAL INSPECTION


External exam: NORMAL EXTERNAL EXAM


Speculum exam: NORMAL SPECULUM EXAM


Bimanual exam: NORMAL BIMANUAL EXAM





- Extremities Exam


Additional comments: 





Patient's Right lower extremity is in an external fixator, but she has distal 

sensation and no loss of motor function; Patient's left wrist in cast, with 

distal sensation and no loss of distal motor function





- Back Exam


Back exam: FULL ROM, NORMAL INSPECTION.  absent: CVA tenderness (L), CVA 

tenderness (R)





- Neurological Exam


Neurological exam: Alert, CN II-XII Intact, Normal Gait, Oriented x3, Reflexes 

Normal





- Psychiatric Exam


Psychiatric exam: Normal Affect, Normal Mood





- Skin


Skin Exam: Dry, Intact, Normal Color, Warm





Discharge Plan





- Discharge Medications


Prescriptions: 


amLODIPine [Norvasc] 5 mg PO DAILY #15 tab


Aspirin 81 mg PO DAILY #30 tab.chew


Calcium Carbonate/Vitamin D [Oyster Shell Calcium/Vitamin D 250 MG-125 Iu] 1 

tab PO DAILY #30 tab


Cephalexin [Keflex] 500 mg PO BID #28 capsule





- Follow Up Plan


Condition: FAIR


Disposition: HOME/ ROUTINE


Instructions:  Ankle Fracture (DC), Wrist Fracture in Adults (DC), External 

Fixation of an Ankle Fracture (DC)


Additional Instructions: 


You have been discharged from [facility]. F/u with Dr Martinez 

in 1 week. If you have any any further episodes of extreme pain please go the 

nearest emergency room. 


Referrals: 


Ameena Michel MD [Primary Care Provider] - 


Tyrel Christianson DO [Staff Provider] - 





<Hugh Phelps - Last Filed: 12/15/17 15:19>





Provider





- Provider


Date of Admission: 


12/13/17 09:59





Attending physician: 


Hugh Phelps MD





Primary care physician: 


Ameena Michel MD








Hospital Course





- Lab Results


Lab Results: 


 Most Recent Lab Values











WBC  12.6 10^3/ul (4.5-11.0)  H  12/14/17  05:30    


 


RBC  4.58 10^6/uL (3.5-6.1)   12/14/17  05:30    


 


Hgb  14.5 g/dL (12.0-16.0)   12/14/17  05:30    


 


Hct  42.3 % (36.0-48.0)   12/14/17  05:30    


 


MCV  92.4 fl (80.0-105.0)   12/14/17  05:30    


 


MCH  31.7 pg (25.0-35.0)   12/14/17  05:30    


 


MCHC  34.3 g/dl (31.0-37.0)   12/14/17  05:30    


 


RDW  13.0 % (11.5-14.5)   12/14/17  05:30    


 


Plt Count  177 10^3/uL (120.0-450.0)   12/14/17  05:30    


 


MPV  11.3 fl (7.0-11.0)  H  12/14/17  05:30    


 


Gran %  71.2 % (50.0-68.0)  H  12/14/17  05:30    


 


Lymph % (Auto)  20.0 % (22.0-35.0)  L  12/14/17  05:30    


 


Mono % (Auto)  6.6 % (1.0-6.0)  H  12/14/17  05:30    


 


Eos % (Auto)  1.7 % (1.5-5.0)   12/14/17  05:30    


 


Baso % (Auto)  0.5 % (0.0-3.0)   12/14/17  05:30    


 


Gran #  9.01  (1.4-6.5)  H  12/14/17  05:30    


 


Lymph #  2.5  (1.2-3.4)   12/14/17  05:30    


 


Mono #  0.8  (0.1-0.6)  H  12/14/17  05:30    


 


Eos #  0.2  (0.0-0.7)   12/14/17  05:30    


 


Baso #  0.06 K/mm3 (0.0-2.0)   12/14/17  05:30    


 


PT  20.3 SECONDS (9.4-12.5)  H  12/13/17  06:00    


 


INR  1.82  (0.93-1.08)  H  12/13/17  06:00    


 


APTT  28.0 Seconds (25.1-36.5)   12/13/17  06:00    


 


Sodium  145 mmol/L (132-148)   12/14/17  05:30    


 


Potassium  3.8 mmol/L (3.6-5.0)   12/14/17  05:30    


 


Chloride  109 mmol/L ()  H  12/14/17  05:30    


 


Carbon Dioxide  22 mmol/L (21-33)   12/14/17  05:30    


 


Anion Gap  18  (10-20)   12/14/17  05:30    


 


BUN  6 mg/dL (7-21)  L  12/14/17  05:30    


 


Creatinine  0.6 mg/dl (0.7-1.2)  L  12/14/17  05:30    


 


Est GFR ( Amer)  > 60   12/14/17  05:30    


 


Est GFR (Non-Af Amer)  > 60   12/14/17  05:30    


 


Random Glucose  114 mg/dL ()  H  12/14/17  05:30    


 


Calcium  9.1 mg/dL (8.4-10.5)   12/14/17  05:30    


 


Phosphorus  3.1 mg/dL (2.5-4.5)   12/14/17  05:30    


 


Total Bilirubin  1.1 mg/dL (0.2-1.3)   12/14/17  05:30    


 


AST  29 U/L (14-36)   12/14/17  05:30    


 


ALT  25 U/L (7-56)   12/14/17  05:30    


 


Alkaline Phosphatase  77 U/L ()   12/14/17  05:30    


 


Total Protein  7.0 g/dL (5.8-8.3)   12/14/17  05:30    


 


Albumin  4.1 g/dL (3.0-4.8)   12/14/17  05:30    


 


Globulin  2.8 gm/dL  12/14/17  05:30    


 


Albumin/Globulin Ratio  1.4  (1.1-1.8)   12/14/17  05:30    


 


Ceruloplasmin  38 mg/dL (18-53)   12/14/17  12:30    


 


25-OH Vitamin D Total  20.5 NG/ML (30.0-100.0)  L  12/14/17  05:30    


 


Procalcitonin  0.11 NG/ML (0.19-0.49)  L  12/12/17  10:23    


 


Urine Color  Yellow  (YELLOW)   12/12/17  19:18    


 


Urine Appearance  Slight-cloudy  (CLEAR)   12/12/17  19:18    


 


Urine pH  6.5  (4.7-8.0)   12/12/17  19:18    


 


Ur Specific Gravity  1.025  (1.005-1.035)   12/12/17  19:18    


 


Urine Protein  Trace mg/dL (<30 mg/dL)  H  12/12/17  19:18    


 


Urine Glucose (UA)  Negative mg/dL (NEGATIVE)   12/12/17  19:18    


 


Urine Ketones  15 mg/dL (NEGATIVE)  H  12/12/17  19:18    


 


Urine Blood  Moderate  (NEGATIVE)  H  12/12/17  19:18    


 


Urine Nitrate  Negative  (NEGATIVE)   12/12/17  19:18    


 


Urine Bilirubin  Negative  (NEGATIVE)   12/12/17  19:18    


 


Urine Urobilinogen  0.2 E.U./dL (<1 E.U./dL)   12/12/17  19:18    


 


Ur Leukocyte Esterase  Negative [place]/uL (NEGATIVE)   12/12/17  19:18    


 


Urine RBC  10 - 15 /hpf (0-2)   12/12/17  19:18    


 


Urine WBC  1 - 3 /hpf (0-6)   12/12/17  19:18    


 


Ur Epithelial Cells  1 - 3 /hpf (0-5)   12/12/17  19:18    


 


Urine Bacteria  Few  (NEG)   12/12/17  19:18    


 


Urine HCG, Qual  Negative  (NEGATIVE)   12/13/17  07:50    


 


Urine Opiates Screen  Positive  (NEGATIVE)  H  12/14/17  08:42    


 


Urine Methadone Screen  Negative  (NEGATIVE)   12/14/17  08:42    


 


Ur Barbiturates Screen  Negative  (NEGATIVE)   12/14/17  08:42    


 


Ur Phencyclidine Scrn  Negative  (NEGATIVE)   12/14/17  08:42    


 


Ur Amphetamines Screen  Negative  (NEGATIVE)   12/14/17  08:42    


 


U Benzodiazepines Scrn  Positive  (NEGATIVE)   12/14/17  08:42    


 


U Oth Cocaine Metabols  Negative  (NEGATIVE)   12/14/17  08:42    


 


U Cannabinoids Screen  Negative  (NEGATIVE)   12/14/17  08:42    














Attending/Attestation





- Attestation


I have personally seen and examined this patient.: Yes


I have fully participated in the care of the patient.: Yes


I have reviewed all pertinent clinical information, including history, physical 

exam and plan: Yes


Notes (Text): 





12/15/17 15:15





attending note;





Patient seen and examined with resident.





Patient is a 37-year-old female with a past medical history of chronic opiate 

dependency, history of left leg injury is admitted with right ankle fracture.


status post external fixator by orthopedics  today.


[place] [facility] [place]


Non-weight bearing on the right leg. PT evaluation appreciated.


use only wheelchair.


commode and wheelchair prescription given.





Leukocytosis;Resolving. no source of infection. Possibly reactive.


will go home with po keflex.


ID evaluation appreciated.





chronic opiate dependency; patient has been on opiates for more than 10 years.





History of anxiety and depression; Psychiatric evaluation appreciated.


needs outpatient psychiatric evaluation.





Active smoking; smoking cessation is strongly advised. Started on Nicoerm patch.





Left wrist fracture; after a fall. Currently on a cast.





history of osteoporosis; continue calcium and vitamin D. Might need outpatient 

DEXA scan.


 Patient said she follows up with endocrinology for metabolic issues.





patient is strongly advised to continue workup with PMD and endocrinology as 

outpatient to rule out other causes of bone abnormality.





prognosis is poor secondary to chronic opiate use, behavioral issues and 

noncompliance with follow-up.





upon discharge the patient will follow up with PMD Dr. Ameena Michel.





diagnosis;


Right ankle fracture


Status post external fixator


Left wrist fracture


Active smoking


Chronic opiate use


Benzodiazepine use


Anxiety depression
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
## 2017-12-14 NOTE — CP.PCM.PN
Subjective





- Date & Time of Evaluation


Date of Evaluation: 12/14/17


Time of Evaluation: 13:15





- Subjective


Subjective: 


Infectious Disease Follow Up:


December 14, 2017





38 yo female with history of falls with pain in the right ankle.  Patient with 

leukocytosis.  She is not cooperative with exam or interview.  She curses alot 

during interactions with her.  Scheduled for OR this morning.  She is seeing a 

neurologist as an outpatient for an unsteady gait.  Leukocytosis of 18.9.  This 

can be secondary to the fracture in her right ankle.





The patient apparently fell in the bathroom overnight fracturing the left wrist 

specifically the left distal radius... unclear if this was secondary to the 

fall or a prior injury before hospitalization.  External fixator placed on the 

right ankle by Dr. Christianson.  Remains on IV antibiotics.








Objective





- Vital Signs/Intake and Output


Vital Signs (last 24 hours): 


 











Temp Pulse Resp BP Pulse Ox


 


 98.7 F   87   16   157/106 H  96 


 


 12/14/17 07:30  12/14/17 10:29  12/14/17 10:29  12/14/17 10:29  12/14/17 07:30








Intake and Output: 


 











 12/14/17 12/14/17





 06:59 18:59


 


Intake Total 1260 


 


Balance 1260 














- Medications


Medications: 


 Current Medications





Acetaminophen (Tylenol 325mg Tab)  650 mg PO Q4 PRN


   PRN Reason: Fever >100.4 F


Aspirin (Aspirin Chewable)  81 mg PO DAILY [facility]


   Last Admin: 12/14/17 11:32 Dose:  81 mg


Calcium/Vitamin D (Oscal-D 250 Mg-125 Units Tab)  1 tab PO DAILY [facility]


   Last Admin: 12/14/17 11:33 Dose:  1 tab


Escitalopram Oxalate (Lexapro)  10 mg PO DAILY [facility]


   Last Admin: 12/14/17 11:32 Dose:  10 mg


Hydromorphone HCl (Dilaudid)  0.5 mg IVP Q5M PRN


   PRN Reason: Pain, severe (8-10)


   Last Admin: 12/13/17 10:23 Dose:  0.5 mg


Lorazepam (Ativan)  1 mg IVP Q4 PRN; Protocol


   PRN Reason: Anxiety


   Last Admin: 12/13/17 20:10 Dose:  1 mg


Morphine Sulfate (Morphine)  2 mg IVP Q4H PRN


   PRN Reason: Pain, severe (8-10)


   Last Admin: 12/14/17 13:33 Dose:  2 mg


Nicotine (Nicoderm Cq)  1 patch TD DAILY [facility]


   Last Admin: 12/14/17 11:33 Dose:  1 patch


Ondansetron HCl (Zofran Inj)  4 mg IVP Q4H PRN


   PRN Reason: Nausea/Vomiting


Ondansetron HCl (Zofran Inj)  4 mg IVP ONCE PRN


   PRN Reason: Nausea/Vomiting


Pantoprazole Sodium (Protonix Ec Tab)  40 mg PO 0600 [facility]


   Last Admin: 12/14/17 05:53 Dose:  40 mg











- Labs


Labs: 


 





 12/14/17 05:30 





 12/14/17 05:30 





 











PT  20.3 SECONDS (9.4-12.5)  H  12/13/17  06:00    


 


INR  1.82  (0.93-1.08)  H  12/13/17  06:00    


 


APTT  28.0 Seconds (25.1-36.5)   12/13/17  06:00    














- Constitutional


Appears: Non-toxic, No Acute Distress, Chronically Ill





- Head Exam


Head Exam: ATRAUMATIC, NORMOCEPHALIC





- Eye Exam


Eye Exam: EOMI, PERRL


Pupil Exam: NORMAL ACCOMODATION, PERRL





- ENT Exam


ENT Exam: Mucous Membranes Moist, Normal External Ear Exam, TM's Normal 

Bilaterally





- Neck Exam


Neck Exam: Full ROM, Normal Inspection





- Respiratory Exam


Respiratory Exam: Clear to Ausculation Bilateral, NORMAL BREATHING PATTERN.  

absent: Rales, Rhonchi, Wheezes





- Cardiovascular Exam


Cardiovascular Exam: REGULAR RHYTHM, RRR, +S1, +S2





- GI/Abdominal Exam


GI & Abdominal Exam: Soft, Tenderness, Normal Bowel Sounds.  absent: Distended





- Extremities Exam


Additional comments: 





left radial fracture.  External fixator in right leg/ankle.





- Neurological Exam


Neurological Exam: Alert, Awake, CN II-XII Intact (irr), Oriented x3





- Psychiatric Exam


Additional comments: 


irritable





- Skin


Skin Exam: Intact, Normal Color





Assessment and Plan





- Assessment and Plan (Free Text)


Assessment: 


38 yo female with right ankle fracture taken to OR for external fixator 

placement.  The patient also broke her left distal radius from a fall while 

going to the bathroom.  The patient has not followed instructions especially 

when told that she could not go to the bathroom without aid and continues to 

place herself in risky positions due to her stubbornness and/or lack of 

understanding of her medical issues.





Case discussed with Dr. Christianson this morning.





Continue IV antibiotics for now.  Cultures negative to date.  Reviewed imaging 

studies done this morning.





Supportive care.





Thank you for allowing me to participate in the care of the patient, we will 

follow with you.

## 2017-12-14 NOTE — RAD
PROCEDURE:  Left Wrist Radiographs.







HISTORY:

FX



COMPARISON:

None.



FINDINGS:



BONES:

Cast obscures fine bony details.  There is an acute transverse 

impacted nondisplaced fracture in the distal radius. There is no 

significant angulation. There is also an acute nondisplaced 

intra-articular fracture in the distal ulna. 



JOINTS:

Normal. No dislocation. 



SOFT TISSUES:

Normal. 



OTHER FINDINGS:

None.



IMPRESSION:

Acute transverse impacted nondisplaced fracture in the distal radius 

without significant angulation.  



Acute nondisplaced intra-articular fracture in the distal ulna.

## 2017-12-14 NOTE — RAD
PROCEDURE:  Right Ankle Radiographs.



HISTORY:

pilon fx  



COMPARISON:

None



FINDINGS:



BONES:

There is an acute comminuted mildly displaced fracture in the distal 

fibula with mild dorsal angulation.  There is an acute nondisplaced 

comminuted intra-articular fracture in the distal tibia. Bone 

alignment is normal. 



JOINTS:

Ankle mortise maintained. Talar dome intact



SOFT TISSUES:

Normal. 



OTHER FINDINGS:

None.



IMPRESSION:

1. Acute comminuted mildly displaced fracture in the distal fibula 

with mild dorsal angulation.



2. Acute nondisplaced comminuted intra articular fracture in the 

distal tibia.

## 2017-12-15 NOTE — PN
DATE:  12/14/2017



She is being seen today for a followup consultation.



PRESENTATION:  The patient is a 37-year-old  female, she was seen

today and accompanies Dr. Valdez at her bedside.  The patient is a little

clear today.  She is much calmer.  She indicates that she feels she is

doing well.  She is at some point wanted to go home, she misses her

children, and her mood appear today to be much more stable than it was

yesterday.  She has been taking Ativan p.r.n., she has been taking her

medications as ordered, and she has been less irritable with the nursing

staff.  She continues to deny any suicidal thoughts or any history of

suicidal thoughts or actions and absolutely none currently.  She indicates

that _____ she does not have any history of drug abuse, the only medication

she takes _____ prescribed for her by doctor for pain and for depression. 

She sees Dr. Hickey at [facility] for her psychiatric

medications and has plan to follow up with him in 03/2018.  She just saw

him in 12/2017.  She denies any current psychiatric difficulties and

appears to have stabilized.



MENTAL STATUS EXAMINATION:  The patient is alert and oriented x3.  Her eye

contact is good.  Her behavior is cooperative.  Her speech rate and volume

are within normal limits.  Mood is euthymic.  Affect is full.  Thought

process goal directed.  She denies being suicidal or homicidal.  Denies the

presence of hallucinations, delusions, or paranoia.  Her concentration and

her focus she reports are improving.  Her memory both short and long term

is adequate.  Her appetite is improving as well as her sleep as she is more

comfortable with her level of pain.



DIAGNOSTIC IMPRESSION:  Depressive disorder, unspecified.



PLAN:  The patient appears to be in no imminent danger of hurting herself

or anyone else.  She appears to be stable psychiatrically and has followup

in a community.  We will sign off on this patient.  If there are any new

symptoms in the future, please call us for return consult.





__________________________________________

Dominick Amezcua MD





DD:  12/14/2017 19:42:01

DT:  12/14/2017 20:37:33

Job # [id number]

## 2017-12-17 ENCOUNTER — HOSPITAL ENCOUNTER (EMERGENCY)
Dept: HOSPITAL 42 - ED | Age: 37
Discharge: HOME | End: 2017-12-17
Payer: MEDICAID

## 2017-12-17 VITALS
RESPIRATION RATE: 18 BRPM | TEMPERATURE: 97.9 F | DIASTOLIC BLOOD PRESSURE: 80 MMHG | SYSTOLIC BLOOD PRESSURE: 118 MMHG | HEART RATE: 100 BPM | OXYGEN SATURATION: 100 %

## 2017-12-17 VITALS — BODY MASS INDEX: 24.7 KG/M2

## 2017-12-17 DIAGNOSIS — S82.851A: Primary | ICD-10-CM
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
## 2017-12-17 DIAGNOSIS — S52.502A: ICD-10-CM
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
## 2017-12-17 NOTE — ED PDOC
Arrival/HPI





- General


Chief Complaint: Trauma


Time Seen by Provider: 12/17/17 20:14


Historian: Patient, Family (Daughter)





- History of Present Illness


Time/Duration: Prior to Arrival


Symptom Onset: Sudden


Symptom Course: Unchanged


Severity Level: Moderate


Associated Symptoms (Text): 





12/17/17 20:27


Patient was discharge from the hospital several days ago following treatment 

with an external fixator of a distal right tibia and fibula comminuted intra-

articular fracture. While in the hospital the patient got out of bed on her own 

and fell and also fractured her wrist. This was treated with closed reduction 

and casting. Patient reports that she was trying to get into bed just prior to 

arrival and missed the edge of the bed and fell reinjuring her right lower 

extremity. The external fixator appears to be in place. There is no erythema on 

the wounds. No fever.





Past Medical History





- Infectious Disease


Hx of Infectious Diseases: None





- Tetanus Immunization


Tetanus Immunization: Up to Date





- Past Medical History


Past Medical History: No Previous





- Cardiac


Hx Hypertension: Yes





- Pulmonary


Hx Pneumonia: Yes





- Neurological


Hx Neurological Disorder: No





- HEENT


Hx HEENT Disorder: No





- Renal


Hx Renal Disorder: No





- Endocrine/Metabolic


Hx Endocrine Disorders: No





- Hematological/Oncological


Hx Blood Transfusions: No





- Integumentary


Hx Dermatological Disorder: No





- Musculoskeletal/Rheumatological


Hx Falls: Yes





- Gastrointestinal


Hx Gastrointestinal Disorders: No





- Genitourinary/Gynecological


Hx Genitourinary Disorders: No





- Psychiatric


Hx Anxiety: Yes


Hx Substance Use: No





- Past Surgical History


Past Surgical History: No Previous





- Surgical History


Hx Musculoskeletal Surgery: Yes





- Anesthesia


Hx Anesthesia: Yes


Hx Anesthesia Reactions: No


Hx Malignant Hyperthermia: No





- Suicidal Assessment


Feels Threatened In Home Enviroment: No





Family/Social History





- Physician Review


Nursing Documentation Reviewed: Yes


Family/Social History: Unknown Family HX


Smoking Status: Current Some Days Smoker


Hx Alcohol Use: No


Hx Substance Use: No


Hx Substance Use Treatment: No





Allergies/Home Meds


Allergies/Adverse Reactions: 


Allergies





No Known Allergies Allergy (Verified 05/18/16 13:13)


 








Home Medications: 


 Home Meds











 Medication  Instructions  Recorded  Confirmed


 


clonazePAM [Klonopin] 0.2 mg PO TID 04/01/15 12/12/17


 


Morphine [MS Contin] 60 mg PO TID 05/18/16 12/12/17


 


oxyCODONE [oxyCODONE Immediate 30 mg PO Q6 05/18/16 12/12/17





Release Tab]   














Review of Systems





- Physician Review


All systems were reviewed & negative as marked: Yes





Physical Exam


Vital Signs











  Temp Pulse Resp BP Pulse Ox


 


 12/17/17 20:08  97.9 F  100 H  18  118/80  100











Temperature: Afebrile


Blood Pressure: Normal


Pulse: Regular


Respiratory Rate: Normal


Appearance: Positive for: Well-Appearing, Non-Toxic, Uncomfortable


Pain Distress: Mild


Mental Status: Positive for: Alert and Oriented X 3





- Systems Exam


Neck: Present: Normal Range of Motion.  No: MIDLINE TENDERNESS, Paraspinal 

Tenderness


Respiratory/Chest: Present: Clear to Auscultation, Good Air Exchange.  No: 

Respiratory Distress, Accessory Muscle Use


Cardiovascular: Present: Regular Rate and Rhythm, Normal S1, S2.  No: Murmurs


Back: Present: Normal Inspection.  No: CVA Tenderness, Midline Tenderness, 

Paraspinal Tenderness


Upper Extremity: Present: Other (Left upper extremity short arm cast)


Lower Extremity: Present: Normal Inspection, Other (Right lower leg external 

fixator).  No: Edema


Neurological: Present: GCS=15, CN II-XII Intact, Speech Normal


Skin: Present: Warm, Dry, Normal Color.  No: Rashes





Medical Decision Making


ED Course and Treatment: 





12/17/17 20:38


Discussed in detail with , who requests plain films of the right 

lower extremity and he will follow-up in the office tomorrow morning at 10 AM.


12/17/17 20:54


 called back and requested that the patient's wrist also be x-rayed 

for him to review in his office tomorrow.





- RAD Interpretation


Radiology Orders: 








12/17/17 20:24


TIBIA FIBULA RIGHT [RAD] Stat 





12/17/17 20:43


WRIST, LEFT 3 VIEWS [RAD] Stat 














Disposition/Present on Arrival





- Present on Arrival


Any Indicators Present on Arrival: No


History of DVT/PE: No


History of Uncontrolled Diabetes: No


Urinary Catheter: No


History of Decub. Ulcer: No


History Surgical Site Infection Following: None





- Disposition


Have Diagnosis and Disposition been Completed?: Yes


Diagnosis: 


 Trimalleolar fracture of right ankle, Colles' fracture of left radius





Disposition: HOME/ ROUTINE


Disposition Time: 21:09


Patient Plan: Discharge


Patient Problems: 


 Current Active Problems











Problem Status Onset


 


Colles' fracture of left radius Acute  


 


Trimalleolar fracture of right ankle Acute  











Condition: FAIR


Discharge Instructions (ExitCare):  Wrist Fracture in Adults (ED)


Additional Instructions: 


Follow-up with  in the office tomorrow morning at 10 AM


Referrals: 


Ameena Michel MD [Primary Care Provider] - Follow up with primary


Forms:  [employer] (English)

## 2017-12-18 NOTE — RAD
PROCEDURE:  Radiographs of the right tibia and fibula. 



HISTORY:

trauma



COMPARISON:

12/14/2017.



TECHNIQUE:

Frontal and lateral views obtained. 



FINDINGS:



BONES:

External fixation device noted mid tibia. Distal tibial and fibular 

fractures are again noted.  There is comminution noted involving the 

fibular fracture. No other fractures are identified more proximally.



JOINT SPACES:

Unremarkable.



OTHER FINDINGS:

None.



IMPRESSION:

Distal tibial and fibular fractures.  Status post external fixation.

## 2017-12-18 NOTE — RAD
PROCEDURE:  Left Wrist Radiographs.







HISTORY:

trayuma



COMPARISON:

12/14/2017



FINDINGS:



BONES:

Bony detail obscured by overlying fiberglass cast. Close reduction of 

distal radial fracture, comminuted, unchanged from prior. Mild 

impaction at fracture. No other fracture appreciated. Previously 

described ulnar styloid process fracture cannot be appreciated on 

this examination. . 



JOINTS:

Normal. No dislocation. 



SOFT TISSUES:

Normal. 



OTHER FINDINGS:

None.



IMPRESSION:

Comminuted transverse distal radial fracture, close reduction.  

Limited examination.

## 2018-06-26 ENCOUNTER — HOSPITAL ENCOUNTER (EMERGENCY)
Dept: HOSPITAL 31 - C.ER | Age: 38
Discharge: HOME | End: 2018-06-26
Payer: COMMERCIAL

## 2018-06-26 VITALS — BODY MASS INDEX: 24.7 KG/M2

## 2018-06-26 VITALS
SYSTOLIC BLOOD PRESSURE: 104 MMHG | HEART RATE: 90 BPM | RESPIRATION RATE: 18 BRPM | DIASTOLIC BLOOD PRESSURE: 68 MMHG | OXYGEN SATURATION: 100 % | TEMPERATURE: 98.1 F

## 2018-06-26 DIAGNOSIS — Z04.1: Primary | ICD-10-CM

## 2018-06-26 DIAGNOSIS — M79.7: ICD-10-CM

## 2018-06-26 DIAGNOSIS — Z72.0: ICD-10-CM

## 2018-06-26 DIAGNOSIS — E78.00: ICD-10-CM

## 2018-06-26 DIAGNOSIS — I10: ICD-10-CM

## 2018-06-26 LAB
ALBUMIN SERPL-MCNC: 4.4 G/DL (ref 3.5–5)
ALBUMIN/GLOB SERPL: 1.4 {RATIO} (ref 1–2.1)
ALT SERPL-CCNC: 27 U/L (ref 9–52)
AST SERPL-CCNC: 19 U/L (ref 14–36)
BASOPHILS # BLD AUTO: 0.1 K/UL (ref 0–0.2)
BASOPHILS NFR BLD: 0.6 % (ref 0–2)
BUN SERPL-MCNC: 7 MG/DL (ref 7–17)
CALCIUM SERPL-MCNC: 9.2 MG/DL (ref 8.6–10.4)
EOSINOPHIL # BLD AUTO: 0.3 K/UL (ref 0–0.7)
EOSINOPHIL NFR BLD: 2.4 % (ref 0–4)
ERYTHROCYTE [DISTWIDTH] IN BLOOD BY AUTOMATED COUNT: 14.8 % (ref 11.5–14.5)
GFR NON-AFRICAN AMERICAN: > 60
HGB BLD-MCNC: 14.8 G/DL (ref 11–16)
LYMPHOCYTES # BLD AUTO: 2.3 K/UL (ref 1–4.3)
LYMPHOCYTES NFR BLD AUTO: 21.3 % (ref 20–40)
MCH RBC QN AUTO: 30 PG (ref 27–31)
MCHC RBC AUTO-ENTMCNC: 33.7 G/DL (ref 33–37)
MCV RBC AUTO: 89 FL (ref 81–99)
MONOCYTES # BLD: 0.6 K/UL (ref 0–0.8)
MONOCYTES NFR BLD: 5.2 % (ref 0–10)
NEUTROPHILS # BLD: 7.6 K/UL (ref 1.8–7)
NEUTROPHILS NFR BLD AUTO: 70.5 % (ref 50–75)
NRBC BLD AUTO-RTO: 0.1 % (ref 0–2)
PLATELET # BLD: 254 K/UL (ref 130–400)
PMV BLD AUTO: 8.5 FL (ref 7.2–11.7)
RBC # BLD AUTO: 4.95 MIL/UL (ref 3.8–5.2)
WBC # BLD AUTO: 10.8 K/UL (ref 4.8–10.8)

## 2018-06-26 PROCEDURE — 85025 COMPLETE CBC W/AUTO DIFF WBC: CPT

## 2018-06-26 PROCEDURE — 72125 CT NECK SPINE W/O DYE: CPT

## 2018-06-26 PROCEDURE — 70450 CT HEAD/BRAIN W/O DYE: CPT

## 2018-06-26 PROCEDURE — 82948 REAGENT STRIP/BLOOD GLUCOSE: CPT

## 2018-06-26 PROCEDURE — 93005 ELECTROCARDIOGRAM TRACING: CPT

## 2018-06-26 PROCEDURE — 99285 EMERGENCY DEPT VISIT HI MDM: CPT

## 2018-06-26 PROCEDURE — 80053 COMPREHEN METABOLIC PANEL: CPT

## 2018-06-26 PROCEDURE — 96360 HYDRATION IV INFUSION INIT: CPT
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
## 2018-06-27 NOTE — CARD
--------------- APPROVED REPORT --------------





EKG Measurement

Heart [id number]

MT [id number]

[id number]

[id number]

[id number]



<Conclusion>

Normal sinus rhythm

Prolonged QT

Abnormal ECG